# Patient Record
Sex: FEMALE | Race: WHITE | Employment: PART TIME | URBAN - NONMETROPOLITAN AREA
[De-identification: names, ages, dates, MRNs, and addresses within clinical notes are randomized per-mention and may not be internally consistent; named-entity substitution may affect disease eponyms.]

---

## 2017-01-03 PROCEDURE — 82274 ASSAY TEST FOR BLOOD FECAL: CPT | Mod: 90 | Performed by: PHYSICIAN ASSISTANT

## 2017-01-03 PROCEDURE — 99000 SPECIMEN HANDLING OFFICE-LAB: CPT | Performed by: PHYSICIAN ASSISTANT

## 2017-01-05 DIAGNOSIS — Z12.11 SPECIAL SCREENING FOR MALIGNANT NEOPLASMS, COLON: ICD-10-CM

## 2017-01-05 LAB — HEMOCCULT STL QL IA: NEGATIVE

## 2017-01-16 ENCOUNTER — OFFICE VISIT (OUTPATIENT)
Dept: FAMILY MEDICINE | Facility: OTHER | Age: 57
End: 2017-01-16
Payer: COMMERCIAL

## 2017-01-16 VITALS
SYSTOLIC BLOOD PRESSURE: 122 MMHG | TEMPERATURE: 95.8 F | BODY MASS INDEX: 33.45 KG/M2 | WEIGHT: 200.8 LBS | HEIGHT: 65 IN | DIASTOLIC BLOOD PRESSURE: 70 MMHG | RESPIRATION RATE: 16 BRPM | HEART RATE: 72 BPM

## 2017-01-16 DIAGNOSIS — Z01.419 WELL WOMAN EXAM WITH ROUTINE GYNECOLOGICAL EXAM: Primary | ICD-10-CM

## 2017-01-16 DIAGNOSIS — I10 HYPERTENSION GOAL BP (BLOOD PRESSURE) < 140/90: ICD-10-CM

## 2017-01-16 DIAGNOSIS — M19.012 PRIMARY OSTEOARTHRITIS OF LEFT SHOULDER: ICD-10-CM

## 2017-01-16 DIAGNOSIS — Z00.00 ROUTINE GENERAL MEDICAL EXAMINATION AT A HEALTH CARE FACILITY: ICD-10-CM

## 2017-01-16 DIAGNOSIS — K21.9 GASTROESOPHAGEAL REFLUX DISEASE WITHOUT ESOPHAGITIS: ICD-10-CM

## 2017-01-16 DIAGNOSIS — Z13.6 CARDIOVASCULAR SCREENING; LDL GOAL LESS THAN 160: ICD-10-CM

## 2017-01-16 DIAGNOSIS — F33.0 MAJOR DEPRESSIVE DISORDER, RECURRENT EPISODE, MILD (H): ICD-10-CM

## 2017-01-16 DIAGNOSIS — J45.20 MILD INTERMITTENT ASTHMA WITHOUT COMPLICATION: ICD-10-CM

## 2017-01-16 LAB
ALBUMIN SERPL-MCNC: 3.5 G/DL (ref 3.4–5)
ALP SERPL-CCNC: 79 U/L (ref 40–150)
ALT SERPL W P-5'-P-CCNC: 19 U/L (ref 0–50)
ANION GAP SERPL CALCULATED.3IONS-SCNC: 4 MMOL/L (ref 3–14)
AST SERPL W P-5'-P-CCNC: 15 U/L (ref 0–45)
BILIRUB SERPL-MCNC: 0.4 MG/DL (ref 0.2–1.3)
BUN SERPL-MCNC: 10 MG/DL (ref 7–30)
CALCIUM SERPL-MCNC: 8.2 MG/DL (ref 8.5–10.1)
CHLORIDE SERPL-SCNC: 104 MMOL/L (ref 94–109)
CHOLEST SERPL-MCNC: 196 MG/DL
CO2 SERPL-SCNC: 33 MMOL/L (ref 20–32)
CREAT SERPL-MCNC: 0.64 MG/DL (ref 0.52–1.04)
ERYTHROCYTE [DISTWIDTH] IN BLOOD BY AUTOMATED COUNT: 14.7 % (ref 10–15)
GFR SERPL CREATININE-BSD FRML MDRD: ABNORMAL ML/MIN/1.7M2
GLUCOSE SERPL-MCNC: 83 MG/DL (ref 70–99)
HCT VFR BLD AUTO: 38.9 % (ref 35–47)
HDLC SERPL-MCNC: 82 MG/DL
HGB BLD-MCNC: 13.1 G/DL (ref 11.7–15.7)
LDLC SERPL CALC-MCNC: 90 MG/DL
MCH RBC QN AUTO: 31.3 PG (ref 26.5–33)
MCHC RBC AUTO-ENTMCNC: 33.7 G/DL (ref 31.5–36.5)
MCV RBC AUTO: 93 FL (ref 78–100)
MICRO REPORT STATUS: ABNORMAL
NONHDLC SERPL-MCNC: 114 MG/DL
PLATELET # BLD AUTO: 229 10E9/L (ref 150–450)
POTASSIUM SERPL-SCNC: 3.4 MMOL/L (ref 3.4–5.3)
PROT SERPL-MCNC: 7.1 G/DL (ref 6.8–8.8)
RBC # BLD AUTO: 4.18 10E12/L (ref 3.8–5.2)
SODIUM SERPL-SCNC: 141 MMOL/L (ref 133–144)
SPECIMEN SOURCE: ABNORMAL
TRIGL SERPL-MCNC: 118 MG/DL
TSH SERPL DL<=0.005 MIU/L-ACNC: 1.22 MU/L (ref 0.4–4)
WBC # BLD AUTO: 6.3 10E9/L (ref 4–11)
WET PREP SPEC: ABNORMAL

## 2017-01-16 PROCEDURE — 84443 ASSAY THYROID STIM HORMONE: CPT | Performed by: PHYSICIAN ASSISTANT

## 2017-01-16 PROCEDURE — 87624 HPV HI-RISK TYP POOLED RSLT: CPT | Performed by: PHYSICIAN ASSISTANT

## 2017-01-16 PROCEDURE — 36415 COLL VENOUS BLD VENIPUNCTURE: CPT | Performed by: PHYSICIAN ASSISTANT

## 2017-01-16 PROCEDURE — 85027 COMPLETE CBC AUTOMATED: CPT | Performed by: PHYSICIAN ASSISTANT

## 2017-01-16 PROCEDURE — 80061 LIPID PANEL: CPT | Performed by: PHYSICIAN ASSISTANT

## 2017-01-16 PROCEDURE — G0145 SCR C/V CYTO,THINLAYER,RESCR: HCPCS | Performed by: PHYSICIAN ASSISTANT

## 2017-01-16 PROCEDURE — 80053 COMPREHEN METABOLIC PANEL: CPT | Performed by: PHYSICIAN ASSISTANT

## 2017-01-16 PROCEDURE — 87210 SMEAR WET MOUNT SALINE/INK: CPT | Performed by: PHYSICIAN ASSISTANT

## 2017-01-16 PROCEDURE — 99396 PREV VISIT EST AGE 40-64: CPT | Performed by: PHYSICIAN ASSISTANT

## 2017-01-16 RX ORDER — OMEPRAZOLE 40 MG/1
CAPSULE, DELAYED RELEASE ORAL
Qty: 90 CAPSULE | Refills: 1 | Status: SHIPPED | OUTPATIENT
Start: 2017-01-16 | End: 2017-08-01

## 2017-01-16 ASSESSMENT — PAIN SCALES - GENERAL: PAINLEVEL: MODERATE PAIN (5)

## 2017-01-16 NOTE — NURSING NOTE
"Chief Complaint   Patient presents with     Physical       Initial /70 mmHg  Pulse 72  Temp(Src) 95.8  F (35.4  C) (Oral)  Resp 16  Ht 5' 5\" (1.651 m)  Wt 200 lb 12.8 oz (91.082 kg)  BMI 33.41 kg/m2  LMP 07/20/2010 (Approximate) Estimated body mass index is 33.41 kg/(m^2) as calculated from the following:    Height as of this encounter: 5' 5\" (1.651 m).    Weight as of this encounter: 200 lb 12.8 oz (91.082 kg).  BP completed using cuff size: anais YBARRA LPN      "

## 2017-01-16 NOTE — PROGRESS NOTES
SUBJECTIVE:     CC: Yamel Martinez is an 56 year old woman who presents for preventive health visit.     Healthy Habits:    Do you get at least three servings of calcium containing foods daily (dairy, green leafy vegetables, etc.)? yes    Amount of exercise or daily activities, outside of work: 3 day(s) per week    Problems taking medications regularly No    Medication side effects: No    Have you had an eye exam in the past two years? no    Do you see a dentist twice per year? yes  Do you have sleep apnea, excessive snoring or daytime drowsiness?no    Struggling with left shoulder pain that appears to be muscular.    Today's PHQ-2 Score:   PHQ-2 ( 1999 [employer]) 1/16/2017 2/9/2015   Q1: Little interest or pleasure in doing things 0 0   Q2: Feeling down, depressed or hopeless 0 0   PHQ-2 Score 0 0       Abuse: Current or Past(Physical, Sexual or Emotional)- No  Do you feel safe in your environment - Yes    Social History   Substance Use Topics     Smoking status: Former Smoker -- 0.50 packs/day     Quit date: 01/01/2013     Smokeless tobacco: Never Used      Comment: Quit 3 yrs ago, however smokes up to 5 cigarettes weekly     Alcohol Use: 0.0 oz/week     0 Standard drinks or equivalent per week      Comment: 2-3  weekly     The patient does not drink >3 drinks per day nor >7 drinks per week.    Recent Labs   Lab Test 03/12/15  02/09/15   1036  05/05/14   1059   CHOL  195  178  187   HDL  52  61  52   LDL  127  97  118   TRIG  81  102  86   CHOLHDLRATIO   --   2.9  4.0       Reviewed orders with patient.  Reviewed health maintenance and updated orders accordingly - Yes    Mammo Decision Support:  Patient over age 50, mutual decision to screen reflected in health maintenance.    Pertinent mammograms are reviewed under the imaging tab.  History of abnormal Pap smear:   Remote - age 30-65 PAP every 5 years with negative HPV co-testing recommended  Last 3 Pap Results:   PAP (no units)   Date Value   02/03/2014 NIL    12/15/2010 NIL   12/10/2007 NIL     All Histories reviewed and updated in Epic.  Past Medical History   Diagnosis Date     Abnormal Papanicolaou smear of cervix and cervical HPV      Obesity, unspecified      Depressive disorder, not elsewhere classified      Unspecified asthma(493.90)      Asthma     Diagnostic skin and sensitization tests 4/17/12 [facility] pos. for dog/DM/M only     Allergic rhinitis due to animal dander      House dust mite allergy      Allergy to mold spores      4/17/12 [facility] pos. for dog/DM/M only     Esophageal reflux 9/9/2015     Hip pain, right 2/2/2016     Seasonal allergic rhinitis 2/2/2016     Osteoarthritis of right hip, unspecified osteoarthritis type 2/2/2016      Past Surgical History   Procedure Laterality Date     C ligate fallopian tube       Hc colp cervix/upper vagina w loop elec bx cervix  1999     C nonspecific procedure       Sinus surgery cyst and septum     C nonspecific procedure       Vein closure     Joint replacement, hip rt/lt  2010     Joint Replacement Hip LT     Incision and drainage hip, combined  2010     Post operative     Joint replacement, hip rt/lt  01/07/13     left total hip arthroplasty     Vulvectomy radical (no groin dissection)  4/18/2013     Procedure: VULVECTOMY RADICAL (NO GROIN DISSECTION);  Left radical Vulvectomy ;  Surgeon: Gloria Broussard MD;  Location: [facility] OR     Arthroplasty hip Right 3/28/2016     Procedure: ARTHROPLASTY HIP;  Surgeon: Juan Espinoza MD;  Location:  OR       ROS:  C: NEGATIVE for fever, chills, change in weight  I: NEGATIVE for worrisome rashes, moles or lesions  E: NEGATIVE for vision changes or irritation  ENT: NEGATIVE for ear, mouth and throat problems  R: NEGATIVE for significant cough or SOB  B: NEGATIVE for masses, tenderness or discharge  CV: NEGATIVE for chest pain, palpitations or peripheral edema  GI: NEGATIVE for nausea, abdominal pain, heartburn, or change in bowel habits  : NEGATIVE for unusual urinary or  "vaginal symptoms. No vaginal bleeding.  M: NEGATIVE for significant arthralgias or myalgia  N: NEGATIVE for weakness, dizziness or paresthesias  P: NEGATIVE for changes in mood or affect     Problem list, Medication list, Allergies, and Medical/Social/Surgical histories reviewed in [facility] and updated as appropriate.  OBJECTIVE:     /70 mmHg  Pulse 72  Temp(Src) 95.8  F (35.4  C) (Oral)  Resp 16  Ht 5' 5\" (1.651 m)  Wt 200 lb 12.8 oz (91.082 kg)  BMI 33.41 kg/m2  LMP 07/20/2010 (Approximate)  EXAM:  GENERAL APPEARANCE: healthy, alert and no distress  EYES: Eyes grossly normal to inspection, PERRL and conjunctivae and sclerae normal  HENT: ear canals and TM's normal, nose and mouth without ulcers or lesions, oropharynx clear and oral mucous membranes moist  NECK: no adenopathy, no asymmetry, masses, or scars and thyroid normal to palpation  RESP: lungs clear to auscultation - no rales, rhonchi or wheezes  BREAST: normal without masses, tenderness or nipple discharge and no palpable axillary masses or adenopathy  CV: regular rate and rhythm, normal S1 S2, no S3 or S4, no murmur, click or rub, no peripheral edema and peripheral pulses strong  ABDOMEN: soft, nontender, no hepatosplenomegaly, no masses and bowel sounds normal  MS: no musculoskeletal defects are noted and gait is age appropriate without ataxia  SKIN: no suspicious lesions or rashes  NEURO: Normal strength and tone, sensory exam grossly normal, mentation intact and speech normal  PSYCH: mentation appears normal and affect normal/bright  Pelvic Exam: mild vaginal atrophy  normal vagina and vulva,normal cervix without lesions or tenderness,uterus normal size anteverted,adenxa normal in size without tenderness,pap smear done,exam chaperoned by nurse.      ASSESSMENT/PLAN:     Yamel was seen today for physical.    Diagnoses and all orders for this visit:    Gastroesophageal reflux disease without esophagitis  -     omeprazole (PRILOSEC) 40 MG " "capsule; TAKE ONE CAPSULE BY MOUTH EVERY DAY 30-60 MINUTES BEFORE A MEAL-PATIENT REQUESTS SANDOZ BRAND (NO FURTHER REFILLS WITHOUT OFFICE VISIT)    Major depressive disorder, recurrent episode, mild (H)  -     DEPRESSION ACTION PLAN (DAP)        COUNSELING:   Reviewed preventive health counseling, as reflected in patient instructions       Regular exercise       Healthy diet/nutrition       Vision screening       Hearing screening         reports that she quit smoking about 4 years ago. She has never used smokeless tobacco.    Estimated body mass index is 33.41 kg/(m^2) as calculated from the following:    Height as of this encounter: 5' 5\" (1.651 m).    Weight as of this encounter: 200 lb 12.8 oz (91.082 kg).   Weight management plan: Discussed healthy diet and exercise guidelines and patient will follow up in 6 months in clinic to re-evaluate.    Counseling Resources:  ATP IV Guidelines  Pooled Cohorts Equation Calculator  Breast Cancer Risk Calculator  FRAX Risk Assessment  ICSI Preventive Guidelines  Dietary Guidelines for Americans, 2010  USDA's MyPlate  ASA Prophylaxis  Lung CA Screening    Albert Devlin PA-C  [facility]  "

## 2017-01-16 NOTE — PATIENT INSTRUCTIONS
Preventive Health Recommendations  Female Ages 50 - 64    Yearly exam: See your health care provider every year in order to  o Review health changes.   o Discuss preventive care.    o Review your medicines if your doctor has prescribed any.      Get a Pap test every three years (unless you have an abnormal result and your provider advises testing more often).    If you get Pap tests with HPV test, you only need to test every 5 years, unless you have an abnormal result.     You do not need a Pap test if your uterus was removed (hysterectomy) and you have not had cancer.    You should be tested each year for STDs (sexually transmitted diseases) if you're at risk.     Have a mammogram every 1 to 2 years.    Have a colonoscopy at age 50, or have a yearly FIT test (stool test). These exams screen for colon cancer.      Have a cholesterol test every 5 years, or more often if advised.    Have a diabetes test (fasting glucose) every three years. If you are at risk for diabetes, you should have this test more often.     If you are at risk for osteoporosis (brittle bone disease), think about having a bone density scan (DEXA).    Shots: Get a flu shot each year. Get a tetanus shot every 10 years.    Nutrition:     Eat at least 5 servings of fruits and vegetables each day.    Eat whole-grain bread, whole-wheat pasta and brown rice instead of white grains and rice.    Talk to your provider about Calcium and Vitamin D.     Lifestyle    Exercise at least 150 minutes a week (30 minutes a day, 5 days a week). This will help you control your weight and prevent disease.    Limit alcohol to one drink per day.    No smoking.     Wear sunscreen to prevent skin cancer.     See your dentist every six months for an exam and cleaning.    See your eye doctor every 1 to 2 years.                                       Rotator Cuff Injury   What is a rotator cuff injury?   A rotator cuff injury is a strain or tear in the group of tendons and  muscles that hold your shoulder joint together and help move your shoulder.   How does it occur?   A rotator cuff injury may result from:     using your arm to break a fall     falling onto your arm     lifting a heavy object     use of your shoulder in sports with a repetitive overhead movement, such as swimming, baseball (mainly pitchers), football, and tennis, which gradually strains the tendon     manual labor such as painting, plastering, raking leaves, or housework   What are the symptoms?   The symptoms of a torn rotator cuff are:     arm and shoulder pain     shoulder weakness     shoulder tenderness     loss of shoulder movement, especially overhead   How is it diagnosed?   Your healthcare provider will examine you and check your shoulder for pain, tenderness, and loss of motion as you move your arm in all directions. Your provider will ask if your shoulder pain began suddenly or gradually. You may have an X-ray to make sure there are not any fractures or bone spurs.   Based on these results, you may have other tests or procedures right away or later, such as:     magnetic resonance imaging (MRI), which creates images of your shoulder and surrounding structures with sound waves     an arthrogram, which is an X-ray or MRI that is taken after a special dye has been injected into your shoulder joint to outline its soft structures     arthroscopy, a surgical procedure in which a small instrument is inserted into your shoulder joint so your provider can look directly at your rotator cuff   What is the treatment?   A tendon in your shoulder can be inflamed, partially torn, or completely torn. What is done about it depends on how torn it is and how much it hurts.   If your tear is a minor one, it can be left to heal by itself if it does not interfere with your everyday activities. Your treatment plan should include:     proper sitting posture, in which your head and shoulders are balanced     rest for your  shoulder, which means avoiding strenuous activity or any overhead motion that causes pain     ice packs at least once a day, and preferably 2 or 3 times a day     doing the exercises your healthcare provider gives you     anti-inflammatory drugs. Adults aged 65 years and older should not take non-steroidal anti-inflammatory medicine for more than 7 days without their healthcare provider's approval.     physical therapy to strengthen your shoulder as it heals   If you have a bad tear, you may need to have it repaired by arthroscopy. Arthroscopy can be used to perform surgery on a joint as well as to see inside the joint. The rough edges of a torn tendon can be trimmed and left to heal. Larger tears can be stitched back together. After surgery, your treatment plan will include physical therapy to strengthen your shoulder as it heals.   How long will the effects last?   Full recovery depends on what is torn and how it is treated.   When can I return to my normal activities?   Everyone recovers from an injury at a different rate. Return to your activities will be determined by how soon your shoulder recovers, not by how many days or weeks it has been since your injury has occurred. In general, the longer you have symptoms before you start treatment, the longer it will take to get better. The goal of rehabilitation is to return you to your normal activities as soon as is safely possible. If you return too soon you may worsen your injury.   You may safely return to your normal activities when:     Your injured shoulder has full range of motion without pain.     Your injured shoulder has regained normal strength compared to the uninjured shoulder.   What can be done to help prevent this from recurring?   The best way to prevent a recurrence is to strengthen your shoulder muscles and keep them in peak condition with shoulder exercises.           Rotator Cuff Strain Rehabilitation Exercises                You may do all of  these exercises right away.   Isometric shoulder external rotation:  a doorway with your elbow bent 90 degrees and the back of the wrist on your injured side pressed against the door frame. Try to press your hand outward into the door frame. Hold for 5 seconds. Do 3 sets of 10.   Isometric shoulder internal rotation:  a doorway with your elbow bent 90 degrees and the front of the wrist on your injured side pressed against the door frame. Try to press your palm into the door frame. Hold for 5 seconds. Do 3 sets of 10.   Wand exercise: Flexion: Stand upright and hold a stick in both hands, palms down. Stretch your arms by lifting them over your head, keeping your arms straight. Hold for 5 seconds and return to the starting position. Repeat 10 times.   Wand exercise: Extension: Stand upright and hold a stick in both hands behind your back. Move the stick away from your back. Hold the end position for 5 seconds. Relax and return to the starting position. Repeat 10 times.   Wand exercise: External rotation: Lie on your back and hold a stick in both hands, palms up. Your upper arms should be resting on the floor with your elbows at your sides and bent 90 degrees. Use your uninjured arm to push your injured arm out away from your body. Keep the elbow of your injured arm at your side while it is being pushed. Hold the stretch for 5 seconds. Repeat 10 times.   Wand exercise: Shoulder abduction and adduction: Stand and hold a stick with both hands, palms facing away from your body. Rest the stick against the front of your thighs. Use your uninjured arm to push your injured arm out to the side and up as high as possible. Keep your arms straight. Hold for 5 seconds. Repeat 10 times.   Resisted shoulder external rotation: Stand sideways next to a door with your injured arm farther from the door. Tie a knot in the end of the tubing and shut the knot in the door at waist level. Hold the other end of the tubing with  the hand of your injured arm. Rest the hand of your injured arm across your stomach. Keeping your elbow in at your side, rotate your arm outward and away from your waist. Make sure you keep your elbow bent 90 degrees and your forearm parallel to the floor. Repeat 10 times. Build up to 3 sets of 10.   Resisted shoulder internal rotation: Stand sideways next to a door with your injured arm closest to the door. Tie a knot in the end of the tubing and shut the knot in the door at waist level. Hold the other end of the tubing with the hand of your injured arm. Bend the elbow of your injured arm 90 degrees. Keeping your elbow in at your side, rotate your forearm across your body and then back to the starting position. Make sure you keep your forearm parallel to the floor. Do 3 sets of 10.   Scaption: Stand with your arms at your sides and with your elbows straight. Slowly raise your arms to eye level. As you raise your arms, spread them apart so that they are only slightly in front of your body (at about a 30-degree angle to the front of your body). Point your thumbs toward the ceiling. Hold for 2 seconds and lower your arms slowly. Do 3 sets of 10. Progress to holding a soup can or light weight when you are doing the exercise and increase the weight as the exercise gets easier.   Side-lying external rotation: Lie on your uninjured side with your injured arm at your side and your elbow bent 90 degrees. Keeping your elbow against your side, raise your forearm toward the ceiling and hold for 2 seconds. Slowly lower your arm. Do 3 sets of 10. You can start doing this exercise holding a soup can or light weight and gradually increase the weight as long as there is no pain.   Horizontal abduction: Lie on your stomach on a table or the edge of a bed with the arm on your injured side hanging down over the edge. Raise your arm out to the side, with your thumb pointed toward the ceiling, until your arm is parallel to the floor.  "Hold for 2 seconds and then lower it slowly. Start this exercise with no weight. As you get stronger, add a light weight or hold a soup can. Do 3 sets of 10.   Push-up with a plus: Begin on the floor on your hands and knees. Keep your arms a shoulder width apart and lift your feet off the floor. Arch your back as high as possible and round your shoulders (this is the \"plus\" part or the exercise). Bend your elbows and lower your body to the floor. Return to the starting position and arch your back again. Do 3 sets of 10.   Published by [employer].  This content is reviewed periodically and is subject to change as new health information becomes available. The information is intended to inform and educate and is not a replacement for medical evaluation, advice, diagnosis or treatment by a healthcare professional.   Written by Shawna Rogers, MS, PT, and Mellisa Matson PT, [facility], [facility], for [employer].   ? 2010 [facility] and/or its affiliates. All Rights Reserved.   Copyright   [employer] 2011  [employer]                  "

## 2017-01-16 NOTE — MR AVS SNAPSHOT
After Visit Summary   1/16/2017    Yamel Martinez    MRN: [id number]           Patient Information     Date Of Birth          1960        Visit Information        Provider Department      1/16/2017 9:00 AM Albert Salmeron PA-C [facility]        Today's Diagnoses     Well woman exam with routine gynecological exam    -  1     Gastroesophageal reflux disease without esophagitis         Major depressive disorder, recurrent episode, mild (H)         Routine general medical examination at a health care facility         Mild intermittent asthma without complication         CARDIOVASCULAR SCREENING; LDL GOAL LESS THAN 160         Hypertension goal BP (blood pressure) < 140/90         Primary osteoarthritis of left shoulder           Care Instructions      Preventive Health Recommendations  Female Ages 50 - 64    Yearly exam: See your health care provider every year in order to  o Review health changes.   o Discuss preventive care.    o Review your medicines if your doctor has prescribed any.      Get a Pap test every three years (unless you have an abnormal result and your provider advises testing more often).    If you get Pap tests with HPV test, you only need to test every 5 years, unless you have an abnormal result.     You do not need a Pap test if your uterus was removed (hysterectomy) and you have not had cancer.    You should be tested each year for STDs (sexually transmitted diseases) if you're at risk.     Have a mammogram every 1 to 2 years.    Have a colonoscopy at age 50, or have a yearly FIT test (stool test). These exams screen for colon cancer.      Have a cholesterol test every 5 years, or more often if advised.    Have a diabetes test (fasting glucose) every three years. If you are at risk for diabetes, you should have this test more often.     If you are at risk for osteoporosis (brittle bone disease), think about having a bone density scan (DEXA).    Shots: Get a flu shot  each year. Get a tetanus shot every 10 years.    Nutrition:     Eat at least 5 servings of fruits and vegetables each day.    Eat whole-grain bread, whole-wheat pasta and brown rice instead of white grains and rice.    Talk to your provider about Calcium and Vitamin D.     Lifestyle    Exercise at least 150 minutes a week (30 minutes a day, 5 days a week). This will help you control your weight and prevent disease.    Limit alcohol to one drink per day.    No smoking.     Wear sunscreen to prevent skin cancer.     See your dentist every six months for an exam and cleaning.    See your eye doctor every 1 to 2 years.                                       Rotator Cuff Injury   What is a rotator cuff injury?   A rotator cuff injury is a strain or tear in the group of tendons and muscles that hold your shoulder joint together and help move your shoulder.   How does it occur?   A rotator cuff injury may result from:     using your arm to break a fall     falling onto your arm     lifting a heavy object     use of your shoulder in sports with a repetitive overhead movement, such as swimming, baseball (mainly pitchers), football, and tennis, which gradually strains the tendon     manual labor such as painting, plastering, raking leaves, or housework   What are the symptoms?   The symptoms of a torn rotator cuff are:     arm and shoulder pain     shoulder weakness     shoulder tenderness     loss of shoulder movement, especially overhead   How is it diagnosed?   Your healthcare provider will examine you and check your shoulder for pain, tenderness, and loss of motion as you move your arm in all directions. Your provider will ask if your shoulder pain began suddenly or gradually. You may have an X-ray to make sure there are not any fractures or bone spurs.   Based on these results, you may have other tests or procedures right away or later, such as:     magnetic resonance imaging (MRI), which creates images of your shoulder  and surrounding structures with sound waves     an arthrogram, which is an X-ray or MRI that is taken after a special dye has been injected into your shoulder joint to outline its soft structures     arthroscopy, a surgical procedure in which a small instrument is inserted into your shoulder joint so your provider can look directly at your rotator cuff   What is the treatment?   A tendon in your shoulder can be inflamed, partially torn, or completely torn. What is done about it depends on how torn it is and how much it hurts.   If your tear is a minor one, it can be left to heal by itself if it does not interfere with your everyday activities. Your treatment plan should include:     proper sitting posture, in which your head and shoulders are balanced     rest for your shoulder, which means avoiding strenuous activity or any overhead motion that causes pain     ice packs at least once a day, and preferably 2 or 3 times a day     doing the exercises your healthcare provider gives you     anti-inflammatory drugs. Adults aged 65 years and older should not take non-steroidal anti-inflammatory medicine for more than 7 days without their healthcare provider's approval.     physical therapy to strengthen your shoulder as it heals   If you have a bad tear, you may need to have it repaired by arthroscopy. Arthroscopy can be used to perform surgery on a joint as well as to see inside the joint. The rough edges of a torn tendon can be trimmed and left to heal. Larger tears can be stitched back together. After surgery, your treatment plan will include physical therapy to strengthen your shoulder as it heals.   How long will the effects last?   Full recovery depends on what is torn and how it is treated.   When can I return to my normal activities?   Everyone recovers from an injury at a different rate. Return to your activities will be determined by how soon your shoulder recovers, not by how many days or weeks it has been  since your injury has occurred. In general, the longer you have symptoms before you start treatment, the longer it will take to get better. The goal of rehabilitation is to return you to your normal activities as soon as is safely possible. If you return too soon you may worsen your injury.   You may safely return to your normal activities when:     Your injured shoulder has full range of motion without pain.     Your injured shoulder has regained normal strength compared to the uninjured shoulder.   What can be done to help prevent this from recurring?   The best way to prevent a recurrence is to strengthen your shoulder muscles and keep them in peak condition with shoulder exercises.           Rotator Cuff Strain Rehabilitation Exercises                You may do all of these exercises right away.   Isometric shoulder external rotation:  a doorway with your elbow bent 90 degrees and the back of the wrist on your injured side pressed against the door frame. Try to press your hand outward into the door frame. Hold for 5 seconds. Do 3 sets of 10.   Isometric shoulder internal rotation:  a doorway with your elbow bent 90 degrees and the front of the wrist on your injured side pressed against the door frame. Try to press your palm into the door frame. Hold for 5 seconds. Do 3 sets of 10.   Wand exercise: Flexion: Stand upright and hold a stick in both hands, palms down. Stretch your arms by lifting them over your head, keeping your arms straight. Hold for 5 seconds and return to the starting position. Repeat 10 times.   Wand exercise: Extension: Stand upright and hold a stick in both hands behind your back. Move the stick away from your back. Hold the end position for 5 seconds. Relax and return to the starting position. Repeat 10 times.   Wand exercise: External rotation: Lie on your back and hold a stick in both hands, palms up. Your upper arms should be resting on the floor with your elbows at your  sides and bent 90 degrees. Use your uninjured arm to push your injured arm out away from your body. Keep the elbow of your injured arm at your side while it is being pushed. Hold the stretch for 5 seconds. Repeat 10 times.   Wand exercise: Shoulder abduction and adduction: Stand and hold a stick with both hands, palms facing away from your body. Rest the stick against the front of your thighs. Use your uninjured arm to push your injured arm out to the side and up as high as possible. Keep your arms straight. Hold for 5 seconds. Repeat 10 times.   Resisted shoulder external rotation: Stand sideways next to a door with your injured arm farther from the door. Tie a knot in the end of the tubing and shut the knot in the door at waist level. Hold the other end of the tubing with the hand of your injured arm. Rest the hand of your injured arm across your stomach. Keeping your elbow in at your side, rotate your arm outward and away from your waist. Make sure you keep your elbow bent 90 degrees and your forearm parallel to the floor. Repeat 10 times. Build up to 3 sets of 10.   Resisted shoulder internal rotation: Stand sideways next to a door with your injured arm closest to the door. Tie a knot in the end of the tubing and shut the knot in the door at waist level. Hold the other end of the tubing with the hand of your injured arm. Bend the elbow of your injured arm 90 degrees. Keeping your elbow in at your side, rotate your forearm across your body and then back to the starting position. Make sure you keep your forearm parallel to the floor. Do 3 sets of 10.   Scaption: Stand with your arms at your sides and with your elbows straight. Slowly raise your arms to eye level. As you raise your arms, spread them apart so that they are only slightly in front of your body (at about a 30-degree angle to the front of your body). Point your thumbs toward the ceiling. Hold for 2 seconds and lower your arms slowly. Do 3 sets of 10.  "Progress to holding a soup can or light weight when you are doing the exercise and increase the weight as the exercise gets easier.   Side-lying external rotation: Lie on your uninjured side with your injured arm at your side and your elbow bent 90 degrees. Keeping your elbow against your side, raise your forearm toward the ceiling and hold for 2 seconds. Slowly lower your arm. Do 3 sets of 10. You can start doing this exercise holding a soup can or light weight and gradually increase the weight as long as there is no pain.   Horizontal abduction: Lie on your stomach on a table or the edge of a bed with the arm on your injured side hanging down over the edge. Raise your arm out to the side, with your thumb pointed toward the ceiling, until your arm is parallel to the floor. Hold for 2 seconds and then lower it slowly. Start this exercise with no weight. As you get stronger, add a light weight or hold a soup can. Do 3 sets of 10.   Push-up with a plus: Begin on the floor on your hands and knees. Keep your arms a shoulder width apart and lift your feet off the floor. Arch your back as high as possible and round your shoulders (this is the \"plus\" part or the exercise). Bend your elbows and lower your body to the floor. Return to the starting position and arch your back again. Do 3 sets of 10.   Published by [employer].  This content is reviewed periodically and is subject to change as new health information becomes available. The information is intended to inform and educate and is not a replacement for medical evaluation, advice, diagnosis or treatment by a healthcare professional.   Written by Shawna Rogers, MS, PT, and Mellisa Matson PT, [facility], [facility], for [facility].   ? 2010 [employer] and/or its affiliates. All Rights Reserved.   Copyright   [employer] 2011  Adult Health Advisor                        Follow-ups after your visit        Your next 10 appointments already scheduled     Jan 20, 2017  " "8:30 AM   [facility] SCREENING DIG[facility] BIL[facility]AL [facility]   [facility]    [address] [facility] [address] [phone]           Do not use any powder, lotion or deodorant under your arms or on your breast. If you do, we will ask you to remove it before your exam.  Wear comfortable, two-piece clothing.  If you have any allergies, tell your care team.  Bring any previous mammograms from other facilities or have them mailed to the breast center.              Who to contact     If you have questions or need follow up information about today's clinic visit or your schedule please contact [facility] directly at [phone].  Normal or non-critical lab and imaging results will be communicated to you by [employer], letter or phone within 4 business days after the clinic has received the results. If you do not hear from us within 7 days, please contact the clinic through [employer] or phone. If you have a critical or abnormal lab result, we will notify you by phone as soon as possible.  Submit refill requests through [facility] or call your pharmacy and they will forward the refill request to us. Please allow 3 business days for your refill to be completed.          Additional Information About Your Visit        [facility]i[employer]ozes Information     [facility] lets you send messages to your doctor, view your test results, renew your prescriptions, schedule appointments and more. To sign up, go to [email or web address] . Click on \"Log in\" on the left side of the screen, which will take you to the Welcome page. Then click on \"Sign up Now\" on the right side of the page.     You will be asked to enter the access code listed below, as well as some personal information. Please follow the directions to create your username and password.     Your access code is: [email or web address]  Expires: 3/19/2017 10:39 AM     Your access code will  in 90 days. If you need help or a new code, please call your " "[facility] or [phone].        Care EveryWhere ID     This is your Care EveryWhere ID. This could be used by other organizations to access your [facility] medical records  [id number]        Your Vitals Were     Pulse Temperature Respirations Height BMI (Body Mass Index) Last Period    72 95.8  F (35.4  C) (Oral) 16 5' 5\" (1.651 m) 33.41 kg/m2 07/20/2010 (Approximate)       Blood Pressure from Last 3 Encounters:   01/16/17 122/70   12/19/16 122/78   03/30/16 110/68    Weight from Last 3 Encounters:   01/16/17 200 lb 12.8 oz (91.082 kg)   12/19/16 198 lb 8 oz (90.039 kg)   07/18/16 199 lb (90.266 kg)              We Performed the Following     CBC with platelets     Comprehensive metabolic panel     DEPRESSION ACTION PLAN (DAP)     Lipid Profile with reflex to direct LDL     TSH with free T4 reflex          Where to get your medicines      These medications were sent to [facility] [place] [address]   [address] [facility] [place] [id number]     Phone:  [phone]    - omeprazole 40 MG capsule       Primary Care Provider Office Phone # Fax #    Albert Salmeron PA-C [phone]       [facility] [address] [facility] [address]        Thank you!     Thank you for choosing [facility]  for your care. Our goal is always to provide you with excellent care. Hearing back from our patients is one way we can continue to improve our services. Please take a few minutes to complete the written survey that you may receive in the mail after your visit with us. Thank you!             Your Updated Medication List - Protect others around you: Learn how to safely use, store and throw away your medicines at www.disposemymeds.org.          This list is accurate as of: 1/16/17  9:26 AM.  Always use your most recent med list.                   Brand Name Dispense Instructions for use    acetaminophen 650 MG 8 hour tablet     100 tablet    Take 650 mg by mouth every 4 hours as " needed for other (surgical pain)       albuterol 108 (90 BASE) MCG/ACT Inhaler   Generic drug:  albuterol     8.5 g    INHALE ONE TO TWO PUFFS BY MOUTH FOUR TIMES A DAY AS NEEDED       CALCIUM-MAGNESIUM-ZINC PO      1 tablet daily       CENTRUM SILVER PO      Take  by mouth.       * FLUoxetine 40 MG capsule    PROzac    90 capsule    TAKE ONE CAPSULE BY MOUTH EVERY DAY WITH 20MG CAPSULE       * FLUoxetine 20 MG capsule    PROzac    90 capsule    TAKE ONE CAPSULE BY MOUTH EVERY DAY       fluticasone 110 MCG/ACT Inhaler    FLOVENT HFA    3 Inhaler    Inhale 2 puffs into the lungs 2 times daily       fluticasone 50 MCG/ACT spray    FLONASE    32 g    Spray 2 sprays into both nostrils 2 times daily       omeprazole 40 MG capsule    priLOSEC    90 capsule    TAKE ONE CAPSULE BY MOUTH EVERY DAY 30-60 MINUTES BEFORE A MEAL-PATIENT REQUESTS SANDOZ BRAND (NO FURTHER REFILLS WITHOUT OFFICE VISIT)       ondansetron 4 MG ODT tab    ZOFRAN-ODT    60 tablet    DISSOLVE TWO TABLETS ON TONGUE EVERY 8 HOURS AS NEEDED FOR NAUSEA       ZYRTEC 10 MG tablet   Generic drug:  cetirizine     1 month    ONE TABLET DAILY       * Notice:  This list has 2 medication(s) that are the same as other medications prescribed for you. Read the directions carefully, and ask your doctor or other care provider to review them with you.

## 2017-01-16 NOTE — Clinical Note
[facility]  [address] [facility] [address] [phone]      January 24, 2017    Yamel Martinez  [address]  [facility] [address]    Dear Yamel,  We are happy to inform you that your PAP smear result from 1/16/17 is normal.  We are now able to do a follow up test on PAP smears. The DNA test is for HPV (Human Papilloma Virus). Cervical cancer is closely linked with certain types of HPV. Your result showed no evidence of high risk HPV.  Therefore we recommend you return in 5 years for your next pap smear and HPV test.  You will still need to return to the clinic every year for an annual exam and other preventive tests.  Please contact the clinic with any questions.  Sincerely,  Albert Devlin PA-C/ross

## 2017-01-17 LAB
COPATH REPORT: NORMAL
PAP: NORMAL

## 2017-01-17 ASSESSMENT — ASTHMA QUESTIONNAIRES: ACT_TOTALSCORE: 22

## 2017-01-20 ENCOUNTER — RADIANT APPOINTMENT (OUTPATIENT)
Dept: MAMMOGRAPHY | Facility: OTHER | Age: 57
End: 2017-01-20
Attending: PHYSICIAN ASSISTANT
Payer: COMMERCIAL

## 2017-01-20 DIAGNOSIS — Z12.31 VISIT FOR SCREENING MAMMOGRAM: ICD-10-CM

## 2017-01-20 LAB
FINAL DIAGNOSIS: NORMAL
HPV HR 12 DNA CVX QL NAA+PROBE: NEGATIVE
HPV16 DNA SPEC QL NAA+PROBE: NEGATIVE
HPV18 DNA SPEC QL NAA+PROBE: NEGATIVE
SPECIMEN DESCRIPTION: NORMAL

## 2017-01-20 PROCEDURE — G0202 SCR MAMMO BI INCL CAD: HCPCS | Mod: TC

## 2017-02-02 DIAGNOSIS — F33.0 MAJOR DEPRESSIVE DISORDER, RECURRENT EPISODE, MILD (H): Primary | ICD-10-CM

## 2017-02-02 RX ORDER — FLUOXETINE 40 MG/1
CAPSULE ORAL
Qty: 90 CAPSULE | Refills: 1 | Status: SHIPPED | OUTPATIENT
Start: 2017-02-02 | End: 2017-09-28

## 2017-02-02 NOTE — TELEPHONE ENCOUNTER
Prozac       Last Written Prescription Date: 10/25/2016  Last Fill Quantity: 90; # refills: 0  Last Office Visit with [facility], [facility] or  Health prescribing provider:  1-        Last PHQ-9 score on record=   PHQ-9 SCORE 12/19/2016   Total Score -   Total Score 0       AST       15   1/16/2017  ALT       19   1/16/2017

## 2017-02-02 NOTE — TELEPHONE ENCOUNTER
Routing refill request to provider for review/approval because:  Drug interaction warning    Catrina Mckeon, RN  [facility]

## 2017-02-08 ENCOUNTER — OFFICE VISIT (OUTPATIENT)
Dept: FAMILY MEDICINE | Facility: OTHER | Age: 57
End: 2017-02-08
Payer: COMMERCIAL

## 2017-02-08 VITALS
TEMPERATURE: 98.6 F | WEIGHT: 195.3 LBS | DIASTOLIC BLOOD PRESSURE: 82 MMHG | HEART RATE: 98 BPM | RESPIRATION RATE: 16 BRPM | SYSTOLIC BLOOD PRESSURE: 130 MMHG | BODY MASS INDEX: 32.5 KG/M2 | OXYGEN SATURATION: 98 %

## 2017-02-08 DIAGNOSIS — R07.0 THROAT PAIN: Primary | ICD-10-CM

## 2017-02-08 DIAGNOSIS — I10 HYPERTENSION GOAL BP (BLOOD PRESSURE) < 140/90: ICD-10-CM

## 2017-02-08 DIAGNOSIS — H66.003 ACUTE SUPPURATIVE OTITIS MEDIA OF BOTH EARS WITHOUT SPONTANEOUS RUPTURE OF TYMPANIC MEMBRANES, RECURRENCE NOT SPECIFIED: ICD-10-CM

## 2017-02-08 LAB
DEPRECATED S PYO AG THROAT QL EIA: NORMAL
MICRO REPORT STATUS: NORMAL
SPECIMEN SOURCE: NORMAL

## 2017-02-08 PROCEDURE — 87081 CULTURE SCREEN ONLY: CPT | Performed by: INTERNAL MEDICINE

## 2017-02-08 PROCEDURE — 87880 STREP A ASSAY W/OPTIC: CPT | Performed by: INTERNAL MEDICINE

## 2017-02-08 PROCEDURE — 99213 OFFICE O/P EST LOW 20 MIN: CPT | Performed by: INTERNAL MEDICINE

## 2017-02-08 RX ORDER — AMOXICILLIN 500 MG/1
500 CAPSULE ORAL 3 TIMES DAILY
Qty: 30 CAPSULE | Refills: 0 | Status: SHIPPED | OUTPATIENT
Start: 2017-02-08 | End: 2017-05-04

## 2017-02-08 ASSESSMENT — PAIN SCALES - GENERAL: PAINLEVEL: EXTREME PAIN (8)

## 2017-02-08 NOTE — PROGRESS NOTES
SUBJECTIVE:                                                    Yamel Martinez is a 56 year old female who presents to clinic today for the following health issues:      Acute Illness   Acute illness concerns: sinus  Onset: 3 days     Fever: no    Chills/Sweats: YES    Headache (location?): YES    Sinus Pressure:YES- tender, post-nasal drainage and facial pain    Conjunctivitis:  no    Ear Pain: feel full    Rhinorrhea: YES    Congestion: YES    Sore Throat: YES     Cough: YES    Wheeze: YES    Decreased Appetite: YES    Nausea: YES    Vomiting: YES- mucus    Diarrhea:  YES    Dysuria/Freq.: no    Fatigue/Achiness: YES    Sick/Strep Exposure: no      Therapies Tried and outcome: acetaminophen and over the counter sinus relief mild relief    CHIEF COMPLAINT:    The patient is a 56-year-old female who presents today with a three-day history of nasal congestion, posterior nasal drainage, some throat pain, and ear pressure. She notes that she's had coughing which is on occasion caused her to almost want to vomit. She has a slightly decreased appetite but is taking fluids adequately. She's had a low-grade fever but has not measured it. She is taking her other medications as recommended.                         PAST, FAMILY,SOCIAL HISTORY:     Medical  History:   has a past medical history of Abnormal Papanicolaou smear of cervix and cervical HPV; Obesity, unspecified; Depressive disorder, not elsewhere classified; Unspecified asthma(493.90); Diagnostic skin and sensitization tests (4/17/12 RAST pos. for dog/DM/M only); Allergic rhinitis due to animal dander; House dust mite allergy; Allergy to mold spores; Esophageal reflux (9/9/2015); Hip pain, right (2/2/2016); Seasonal allergic rhinitis (2/2/2016); Osteoarthritis of right hip, unspecified osteoarthritis type (2/2/2016); and Primary osteoarthritis of left shoulder (1/16/2017).     Surgical History:   has past surgical history that includes LIGATE FALLOPIAN TUBE; COLP  CERVIX/UPPER VAGINA W LOOP ELEC BX CERVIX (1999); NONSPECIFIC PROCEDURE; NONSPECIFIC PROCEDURE; joint replacement, hip rt/lt (2010); Incision and drainage hip, combined (2010); joint replacement, hip rt/lt (01/07/13); Vulvectomy radical (no groin dissection) (4/18/2013); and Arthroplasty hip (Right, 3/28/2016).     Social History:   reports that she quit smoking about 4 years ago. She has never used smokeless tobacco. She reports that she drinks alcohol. She reports that she does not use illicit drugs.     Family History:  family history includes Breast Cancer in her maternal grandmother; DIABETES in her father and paternal grandmother; Hypertension in her mother; Thyroid Disease in her father. There is no history of Anesthesia Reaction, Blood Disease, C.A.D., or Cancer - colorectal.            MEDICATIONS  Current Outpatient Prescriptions   Medication Sig Dispense Refill     amoxicillin (AMOXIL) 500 MG capsule Take 1 capsule (500 mg) by mouth 3 times daily 30 capsule 0     FLUoxetine (PROZAC) 20 MG capsule TAKE ONE CAPSULE BY MOUTH EVERY DAY 90 capsule 1     FLUoxetine (PROZAC) 40 MG capsule TAKE ONE CAPSULE BY MOUTH EVERY DAY WITH 20MG CAPSULE 90 capsule 1     omeprazole (PRILOSEC) 40 MG capsule TAKE ONE CAPSULE BY MOUTH EVERY DAY 30-60 MINUTES BEFORE A MEAL-PATIENT REQUESTS SANDOZ BRAND (NO FURTHER REFILLS WITHOUT OFFICE VISIT) 90 capsule 1     ondansetron (ZOFRAN-ODT) 4 MG ODT tab DISSOLVE TWO TABLETS ON TONGUE EVERY 8 HOURS AS NEEDED FOR NAUSEA 60 tablet 0     fluticasone (FLOVENT HFA) 110 MCG/ACT Inhaler Inhale 2 puffs into the lungs 2 times daily 3 Inhaler 1     acetaminophen 650 MG 8 hour tablet Take 650 mg by mouth every 4 hours as needed for other (surgical pain) 100 tablet 1     CALCIUM-MAGNESIUM-ZINC PO 1 tablet daily       ALBUTEROL 108 (90 BASE) MCG/ACT inhaler INHALE ONE TO TWO PUFFS BY MOUTH FOUR TIMES A DAY AS NEEDED 8.5 g 5     Multiple Vitamins-Minerals (CENTRUM SILVER PO) Take  by mouth.        ZYRTEC 10 MG OR TABS ONE TABLET DAILY 1 month 2     [DISCONTINUED] FLUoxetine (PROZAC) 40 MG capsule TAKE ONE CAPSULE BY MOUTH EVERY DAY WITH 20MG CAPSULE 90 capsule 0     [DISCONTINUED] FLUoxetine (PROZAC) 20 MG capsule TAKE ONE CAPSULE BY MOUTH EVERY DAY 90 capsule 0     fluticasone (FLONASE) 50 MCG/ACT nasal spray Spray 2 sprays into both nostrils 2 times daily 32 g 9         --------------------------------------------------------------------------------------------------------------------                          REVIEW OF SYSTEMS:         LUNGS: Pt denies: hemoptysis, or shortness of breath.   HEART: Pt denies: chest pain, arrythmia, syncope, tachy or bradyarrhythmia or excess edema.   GI: Pt denies: nausea, vomitting, diarrhea, constipation, melena, or hematochezia.   NEURO: Pt denies: seizures, strokes, diplopia, weakness, paraesthesias, or paralysis.   SKIN: Pt denies: itching, rashes, discoloration, or specific lesions of concern. Denies recent hair loss.                          EXAMINATION:         /82 mmHg  Pulse 98  Temp(Src) 98.6  F (37  C) (Temporal)  Resp 16  Wt 195 lb 4.8 oz (88.587 kg)  SpO2 98%  LMP 07/20/2010 (Approximate)   Constitutional: The patient appears to be in no acute distress. The patient appears to be adequately hydrated. No acute respiratory or hemodynamic distress is noted at this time.   LUNGS: clear bilaterally, airflow is brisk, no intercostal retraction or stridor is noted. No coughing is noted during visit.   HEART:  regular without rubs, clicks, gallops, or murmurs. PMI is nondisplaced. Upstrokes are brisk. S1,S2 are heard.   GI: Abdomen is soft, without rebound, guarding or tenderness. Bowel sounds are appropriate. No renal bruits are heard.    NEURO: Pt is alert and appropriate. No neurologic lateralization is noted. Cranial nerves 2-12 are intact. Peripheral sensory and motor function are grossly normal   SKIN:  warm and dry. No erythema, or rashes are noted.  No specific lesions of concern are noted.                 ENT: Nasal mucosa is edematous, thick yellow exudates are seen. Incomplete nasal obstruction is present. Pharynx is erythematous without  exudates, no significant cervical adenopathy is present. Tympanic membranes show retraction and erythema without evidence of perforation. Thyroid is not nodular or enlarged.                        DECISION MAKIN. Throat pain  Amoxicillin 500 mg 3 times daily  - Strep, Rapid Screen negative  - Beta strep group A culture    2. Acute suppurative otitis media of both ears without spontaneous rupture of tympanic membranes, recurrence not specified    - amoxicillin (AMOXIL) 500 MG capsule; Take 1 capsule (500 mg) by mouth 3 times daily  Dispense: 30 capsule; Refill: 0    3. Hypertension goal BP (blood pressure) < 140/90  Continue low-salt diet and weight loss                           FOLLOW UP    I have asked the patient to make an appointment for follow up with me in for her primary care provider as needed    I have carefully explained the diagnosis and treatment options with the patient. The patient has displayed an understanding of the above, and all subsequent questions were answered.       DO GUSTAVO Stovall    Portions of this note were produced using VirnetX  Although every attempt at real-time proof reading has been made, occasional grammar/syntax errors may have been missed.

## 2017-02-08 NOTE — MR AVS SNAPSHOT
"              After Visit Summary   2017    Yamel Martinez    MRN: [id number]           Patient Information     Date Of Birth          1960        Visit Information        Provider Department      2017 10:20 AM Dae Celis DO [facility]        Today's Diagnoses     Throat pain    -  1     Acute suppurative otitis media of both ears without spontaneous rupture of tympanic membranes, recurrence not specified         Hypertension goal BP (blood pressure) < 140/90            Follow-ups after your visit        Who to contact     If you have questions or need follow up information about today's clinic visit or your schedule please contact [facility] directly at [phone].  Normal or non-critical lab and imaging results will be communicated to you by MyChart, letter or phone within 4 business days after the clinic has received the results. If you do not hear from us within 7 days, please contact the clinic through MyChart or phone. If you have a critical or abnormal lab result, we will notify you by phone as soon as possible.  Submit refill requests through Evernote or call your pharmacy and they will forward the refill request to us. Please allow 3 business days for your refill to be completed.          Additional Information About Your Visit        MyChart Information     Evernote lets you send messages to your doctor, view your test results, renew your prescriptions, schedule appointments and more. To sign up, go to [email or web address] . Click on \"Log in\" on the left side of the screen, which will take you to the Welcome page. Then click on \"Sign up Now\" on the right side of the page.     You will be asked to enter the access code listed below, as well as some personal information. Please follow the directions to create your username and password.     Your access code is: [email or web address]  Expires: 3/19/2017 10:39 AM     Your access code will  in 90 days. If " you need help or a new code, please call your [facility] or [phone].        Care EveryWhere ID     This is your Care EveryWhere ID. This could be used by other organizations to access your [facility] medical records  [id number]        Your Vitals Were     Pulse Temperature Respirations Pulse Oximetry Last Period       98 98.6  F (37  C) (Temporal) 16 98% 07/20/2010 (Approximate)        Blood Pressure from Last 3 Encounters:   02/08/17 130/82   01/16/17 122/70   12/19/16 122/78    Weight from Last 3 Encounters:   02/08/17 195 lb 4.8 oz (88.587 kg)   01/16/17 200 lb 12.8 oz (91.082 kg)   12/19/16 198 lb 8 oz (90.039 kg)              We Performed the Following     Beta strep group A culture     Strep, Rapid Screen          Today's Medication Changes          These changes are accurate as of: 2/8/17  3:26 PM.  If you have any questions, ask your nurse or doctor.               Start taking these medicines.        Dose/Directions    amoxicillin 500 MG capsule   Commonly known as:  AMOXIL   Used for:  Acute suppurative otitis media of both ears without spontaneous rupture of tympanic membranes, recurrence not specified   Started by:  Dae Celis DO        Dose:  500 mg   Take 1 capsule (500 mg) by mouth 3 times daily   Quantity:  30 capsule   Refills:  0            Where to get your medicines      These medications were sent to [facility] [address]   [address] [facility] [address]     Phone:  [phone]    - amoxicillin 500 MG capsule             Primary Care Provider Office Phone # Fax #    Albert Salmeron PA-C [phone]       [facility] [address] [facility] [address]        Thank you!     Thank you for choosing [facility]  for your care. Our goal is always to provide you with excellent care. Hearing back from our patients is one way we can continue to improve our services. Please take a few minutes to complete the  written survey that you may receive in the mail after your visit with us. Thank you!             Your Updated Medication List - Protect others around you: Learn how to safely use, store and throw away your medicines at www.disposemymeds.org.          This list is accurate as of: 2/8/17  3:26 PM.  Always use your most recent med list.                   Brand Name Dispense Instructions for use    acetaminophen 650 MG 8 hour tablet     100 tablet    Take 650 mg by mouth every 4 hours as needed for other (surgical pain)       albuterol 108 (90 BASE) MCG/ACT Inhaler   Generic drug:  albuterol     8.5 g    INHALE ONE TO TWO PUFFS BY MOUTH FOUR TIMES A DAY AS NEEDED       amoxicillin 500 MG capsule    AMOXIL    30 capsule    Take 1 capsule (500 mg) by mouth 3 times daily       CALCIUM-MAGNESIUM-ZINC PO      1 tablet daily       CENTRUM SILVER PO      Take  by mouth.       * FLUoxetine 20 MG capsule    PROzac    90 capsule    TAKE ONE CAPSULE BY MOUTH EVERY DAY       * FLUoxetine 40 MG capsule    PROzac    90 capsule    TAKE ONE CAPSULE BY MOUTH EVERY DAY WITH 20MG CAPSULE       fluticasone 110 MCG/ACT Inhaler    FLOVENT HFA    3 Inhaler    Inhale 2 puffs into the lungs 2 times daily       fluticasone 50 MCG/ACT spray    FLONASE    32 g    Spray 2 sprays into both nostrils 2 times daily       omeprazole 40 MG capsule    priLOSEC    90 capsule    TAKE ONE CAPSULE BY MOUTH EVERY DAY 30-60 MINUTES BEFORE A MEAL-PATIENT REQUESTS SANDOZ BRAND (NO FURTHER REFILLS WITHOUT OFFICE VISIT)       ondansetron 4 MG ODT tab    ZOFRAN-ODT    60 tablet    DISSOLVE TWO TABLETS ON TONGUE EVERY 8 HOURS AS NEEDED FOR NAUSEA       ZYRTEC 10 MG tablet   Generic drug:  cetirizine     1 month    ONE TABLET DAILY       * Notice:  This list has 2 medication(s) that are the same as other medications prescribed for you. Read the directions carefully, and ask your doctor or other care provider to review them with you.

## 2017-02-08 NOTE — NURSING NOTE
"Chief Complaint   Patient presents with     Sinus Problem     3 days       Initial /82 mmHg  Pulse 98  Temp(Src) 98.6  F (37  C) (Temporal)  Resp 16  Wt 195 lb 4.8 oz (88.587 kg)  SpO2 98%  LMP 07/20/2010 (Approximate) Estimated body mass index is 32.5 kg/(m^2) as calculated from the following:    Height as of 1/16/17: 5' 5\" (1.651 m).    Weight as of this encounter: 195 lb 4.8 oz (88.587 kg).  BP completed using cuff size: large  Health Maintenance Due   Topic Date Due     ADVANCE DIRECTIVE PLANNING Q5 YRS (NO INBASKET)  12/19/1979     HEPATITIS C SCREENING  12/19/1979     MICROALBUMIN Q1 YEAR( NO INBASKET)  02/03/2015     Health Maintenance reviewed at today's visit patient asked to schedule/complete:     Brandi Gordon MA     2/8/2017    "

## 2017-02-10 LAB
BACTERIA SPEC CULT: NORMAL
MICRO REPORT STATUS: NORMAL
SPECIMEN SOURCE: NORMAL

## 2017-05-04 ENCOUNTER — OFFICE VISIT (OUTPATIENT)
Dept: FAMILY MEDICINE | Facility: OTHER | Age: 57
End: 2017-05-04
Payer: COMMERCIAL

## 2017-05-04 VITALS
TEMPERATURE: 97.6 F | HEART RATE: 80 BPM | WEIGHT: 202.7 LBS | RESPIRATION RATE: 20 BRPM | DIASTOLIC BLOOD PRESSURE: 82 MMHG | SYSTOLIC BLOOD PRESSURE: 118 MMHG | BODY MASS INDEX: 33.73 KG/M2

## 2017-05-04 DIAGNOSIS — J01.01 ACUTE RECURRENT MAXILLARY SINUSITIS: Primary | ICD-10-CM

## 2017-05-04 DIAGNOSIS — J45.20 MILD INTERMITTENT ASTHMA WITHOUT COMPLICATION: ICD-10-CM

## 2017-05-04 PROCEDURE — 99213 OFFICE O/P EST LOW 20 MIN: CPT | Performed by: FAMILY MEDICINE

## 2017-05-04 RX ORDER — FLUTICASONE PROPIONATE 110 UG/1
2 AEROSOL, METERED RESPIRATORY (INHALATION) 2 TIMES DAILY
Qty: 3 INHALER | Refills: 1 | COMMUNITY
Start: 2017-05-04 | End: 2017-09-28

## 2017-05-04 RX ORDER — AMOXICILLIN AND CLAVULANATE POTASSIUM 500; 125 MG/1; MG/1
1 TABLET, FILM COATED ORAL 2 TIMES DAILY
Qty: 20 TABLET | Refills: 0 | Status: SHIPPED | OUTPATIENT
Start: 2017-05-04 | End: 2017-06-01

## 2017-05-04 ASSESSMENT — PAIN SCALES - GENERAL: PAINLEVEL: SEVERE PAIN (6)

## 2017-05-04 NOTE — NURSING NOTE
"Chief Complaint   Patient presents with     Sinus Problem       Initial /82 (BP Location: Left arm, Patient Position: Chair, Cuff Size: Adult Large)  Pulse 80  Temp 97.6  F (36.4  C) (Temporal)  Resp 20  Wt 202 lb 11.2 oz (91.9 kg)  LMP 07/20/2010 (Approximate)  BMI 33.73 kg/m2 Estimated body mass index is 33.73 kg/(m^2) as calculated from the following:    Height as of 1/16/17: 5' 5\" (1.651 m).    Weight as of this encounter: 202 lb 11.2 oz (91.9 kg).  Medication Reconciliation: complete  "

## 2017-05-04 NOTE — PROGRESS NOTES
SUBJECTIVE:                                                    Yamel Martinez is a 56 year old female who presents to clinic today for the following health issues:      RESPIRATORY SYMPTOMS      Duration: 4 days    Description  nasal congestion, rhinorrhea, sore throat, facial pain/pressure, cough, wheezing, ear pain both, headache, fatigue/malaise, hoarse voice, myalgias, conjunctival irritation and nausea    Severity: severe    Accompanying signs and symptoms: None    History (predisposing factors):  none    Precipitating or alleviating factors: None    Therapies tried and outcome:  rest and fluids antihistamine acetaminophen       SUBJECTIVE:    Yamel is a 56 year old female presenting with nasal congestion, post nasal drip, headache, tooth pain and facial pressure.  Predisposing factors include allergies  Hx recurrent sinustitis    Patient Active Problem List   Diagnosis     Mild intermittent asthma without complication     CARDIOVASCULAR SCREENING; LDL GOAL LESS THAN 160     Chronic maxillary sinusitis     Major depressive disorder, recurrent episode, mild (H)     Diagnostic skin and sensitization tests     Allergic rhinitis due to animal dander     House dust mite allergy     Allergy to mold spores     Post-operative pain     Vulvar cancer (H)     Hypertension goal BP (blood pressure) < 140/90     Asymptomatic varicose veins     Gastroesophageal reflux disease without esophagitis     Hip pain, right     Seasonal allergic rhinitis     Osteoarthritis of right hip, unspecified osteoarthritis type     Arthritis of right hip     S/P total hip arthroplasty     Primary osteoarthritis of left shoulder       Current Outpatient Prescriptions   Medication Sig Dispense Refill     fluticasone (FLOVENT HFA) 110 MCG/ACT Inhaler Inhale 2 puffs into the lungs 2 times daily 3 Inhaler 1     amoxicillin-clavulanate (AUGMENTIN) 500-125 MG per tablet Take 1 tablet by mouth 2 times daily 20 tablet 0     FLUoxetine (PROZAC) 20 MG  capsule TAKE ONE CAPSULE BY MOUTH EVERY DAY 90 capsule 1     FLUoxetine (PROZAC) 40 MG capsule TAKE ONE CAPSULE BY MOUTH EVERY DAY WITH 20MG CAPSULE 90 capsule 1     omeprazole (PRILOSEC) 40 MG capsule TAKE ONE CAPSULE BY MOUTH EVERY DAY 30-60 MINUTES BEFORE A MEAL-PATIENT REQUESTS SANDOZ BRAND (NO FURTHER REFILLS WITHOUT OFFICE VISIT) 90 capsule 1     ondansetron (ZOFRAN-ODT) 4 MG ODT tab DISSOLVE TWO TABLETS ON TONGUE EVERY 8 HOURS AS NEEDED FOR NAUSEA 60 tablet 0     acetaminophen 650 MG 8 hour tablet Take 650 mg by mouth every 4 hours as needed for other (surgical pain) 100 tablet 1     CALCIUM-MAGNESIUM-ZINC PO 1 tablet daily       ALBUTEROL 108 (90 BASE) MCG/ACT inhaler INHALE ONE TO TWO PUFFS BY MOUTH FOUR TIMES A DAY AS NEEDED 8.5 g 5     Multiple Vitamins-Minerals (CENTRUM SILVER PO) Take  by mouth.       ZYRTEC 10 MG OR TABS ONE TABLET DAILY 1 month 2     [DISCONTINUED] fluticasone (FLOVENT HFA) 110 MCG/ACT Inhaler Inhale 2 puffs into the lungs 2 times daily 3 Inhaler 1       Allergies   Allergen Reactions     Latex Itching     Lisinopril      Cough     Red Dye        History   Smoking Status     Former Smoker     Packs/day: 0.50     Quit date: 1/1/2013   Smokeless Tobacco     Never Used     Comment: Quit 3 yrs ago, however smokes up to 5 cigarettes weekly       OBJECTIVE:  General appearance: nad  Skin color is nl  Hydration status appears adequate with normal skin turgor and moist mucous membranes.    HEENT:   Left TM is nl  Right TM is nl  Throat is nl  Neck is supple w/o adenopathy    CARDIAC:nl  LUNGS: clear  ABDOMEN: ne    CXR Findings: ne    ASSESSMENT:    Acute  Recurrent sinusitis    PLAN: extra fluids, humidity, otc decongestants, call back if no improvement in 4-5 days, sooner if increasing sx.    rx augmentin

## 2017-05-04 NOTE — MR AVS SNAPSHOT
"              After Visit Summary   2017    Yamel Martinez    MRN: [id number]           Patient Information     Date Of Birth          1960        Visit Information        Provider Department      2017 9:30 AM Heron Fisher MD [facility]        Today's Diagnoses     Acute recurrent maxillary sinusitis    -  1    Mild intermittent asthma without complication           Follow-ups after your visit        Who to contact     If you have questions or need follow up information about today's clinic visit or your schedule please contact [facility] directly at [phone].  Normal or non-critical lab and imaging results will be communicated to you by [email or web address], letter or phone within 4 business days after the clinic has received the results. If you do not hear from us within 7 days, please contact the clinic through [email or web address] or phone. If you have a critical or abnormal lab result, we will notify you by phone as soon as possible.  Submit refill requests through SocialBrowse or call your pharmacy and they will forward the refill request to us. Please allow 3 business days for your refill to be completed.          Additional Information About Your Visit        MyChart Information     SocialBrowse lets you send messages to your doctor, view your test results, renew your prescriptions, schedule appointments and more. To sign up, go to [email or web address] . Click on \"Log in\" on the left side of the screen, which will take you to the Welcome page. Then click on \"Sign up Now\" on the right side of the page.     You will be asked to enter the access code listed below, as well as some personal information. Please follow the directions to create your username and password.     Your access code is: [email or web address]  Expires: 2017  9:58 AM     Your access code will  in 90 days. If you need help or a new code, please call your [facility] or [phone].        Care EveryWhere ID     This is your " Care EveryWhere ID. This could be used by other organizations to access your [facility] medical records  [id number]        Your Vitals Were     Pulse Temperature Respirations Last Period BMI (Body Mass Index)       80 97.6  F (36.4  C) (Temporal) 20 07/20/2010 (Approximate) 33.73 kg/m2        Blood Pressure from Last 3 Encounters:   05/04/17 118/82   02/08/17 130/82   01/16/17 122/70    Weight from Last 3 Encounters:   05/04/17 202 lb 11.2 oz (91.9 kg)   02/08/17 195 lb 4.8 oz (88.6 kg)   01/16/17 200 lb 12.8 oz (91.1 kg)              Today, you had the following     No orders found for display         Today's Medication Changes          These changes are accurate as of: 5/4/17  9:58 AM.  If you have any questions, ask your nurse or doctor.               Start taking these medicines.        Dose/Directions    amoxicillin-clavulanate 500-125 MG per tablet   Commonly known as:  AUGMENTIN   Used for:  Acute recurrent maxillary sinusitis   Started by:  Heron Fisher MD        Dose:  1 tablet   Take 1 tablet by mouth 2 times daily   Quantity:  20 tablet   Refills:  0            Where to get your medicines      These medications were sent to [facility] [address]   [address] [facility] [address]     Phone:  [phone]     amoxicillin-clavulanate 500-125 MG per tablet                Primary Care Provider Office Phone # Fax #    Albert Salmeron PA-C [phone]       [facility] [address] [facility] [address]        Thank you!     Thank you for choosing [facility]  for your care. Our goal is always to provide you with excellent care. Hearing back from our patients is one way we can continue to improve our services. Please take a few minutes to complete the written survey that you may receive in the mail after your visit with us. Thank you!             Your Updated Medication List - Protect others around you: Learn how to safely use, store and  throw away your medicines at www.disposemymeds.org.          This list is accurate as of: 5/4/17  9:58 AM.  Always use your most recent med list.                   Brand Name Dispense Instructions for use    acetaminophen 650 MG 8 hour tablet     100 tablet    Take 650 mg by mouth every 4 hours as needed for other (surgical pain)       albuterol 108 (90 BASE) MCG/ACT Inhaler   Generic drug:  albuterol     8.5 g    INHALE ONE TO TWO PUFFS BY MOUTH FOUR TIMES A DAY AS NEEDED       amoxicillin-clavulanate 500-125 MG per tablet    AUGMENTIN    20 tablet    Take 1 tablet by mouth 2 times daily       CALCIUM-MAGNESIUM-ZINC PO      1 tablet daily       CENTRUM SILVER PO      Take  by mouth.       FLOVENT  MCG/ACT Inhaler   Generic drug:  fluticasone     3 Inhaler    Inhale 2 puffs into the lungs 2 times daily       * FLUoxetine 20 MG capsule    PROzac    90 capsule    TAKE ONE CAPSULE BY MOUTH EVERY DAY       * FLUoxetine 40 MG capsule    PROzac    90 capsule    TAKE ONE CAPSULE BY MOUTH EVERY DAY WITH 20MG CAPSULE       omeprazole 40 MG capsule    priLOSEC    90 capsule    TAKE ONE CAPSULE BY MOUTH EVERY DAY 30-60 MINUTES BEFORE A MEAL-PATIENT REQUESTS SANDOZ BRAND (NO FURTHER REFILLS WITHOUT OFFICE VISIT)       ondansetron 4 MG ODT tab    ZOFRAN-ODT    60 tablet    DISSOLVE TWO TABLETS ON TONGUE EVERY 8 HOURS AS NEEDED FOR NAUSEA       ZYRTEC 10 MG tablet   Generic drug:  cetirizine     1 month    ONE TABLET DAILY       * Notice:  This list has 2 medication(s) that are the same as other medications prescribed for you. Read the directions carefully, and ask your doctor or other care provider to review them with you.

## 2017-05-31 ENCOUNTER — TELEPHONE (OUTPATIENT)
Dept: FAMILY MEDICINE | Facility: OTHER | Age: 57
End: 2017-05-31

## 2017-05-31 NOTE — TELEPHONE ENCOUNTER
Spoke to the patient. She said on Saturday she fell on her boat. She said since then, she has had pain all over her body. She said she has a lot of pain up by her ribs. She said she has arm pain and pain under her breasts. Patient said she has also noticed trouble breathing since Saturday. She said she has a lot of pain with taking deep breaths. She also said she has a cough with wheezing. Patient said she cant sleep and seems to be coughing up a lot of crud.     Patient denies: chest pain, blue lips/tongue, clammy skin, feeling of suffocation, frothy pink sputum/white, altered mental status, severe SOB, HX blood clots/lung collapse, severe wheezing, inability to speak, drooling, or trouble breathing after inhaling smoke/flames.     RECOMMENDED DISPOSITION:  To ED, another person to drive - since patient has pain when taking a deep breath, RN advised she should be seen in the ED. Patient does not want to go to the ED. She requested an appointment in clinic. RN did offer an afternoon appointment today but patient declined. She wanted to really see Dr. Fisher and asked about an appointment tomorrow. RN did find patient an appointment with Dr. Fisher tomorrow morning but did advise the ED today would be recommended. Patient refused the ED.   Will comply with recommendation: no - patient refused the ED. She is scheduled tomorrow morning.    If further questions/concerns or if Sx do not improve, worsen or new Sx develop, call your PCP or [facility] as soon as possible.    NOTES:  Disposition was determined by the first positive assessment question, therefore all previous assessment questions were negative.  Informed to check provider manual or call insurance company to assure coverage.    Guideline used: Breathing Problems  Telephone Triage Protocols for Nurses, Fifth Edition, Magali Mckeon RN

## 2017-06-01 ENCOUNTER — OFFICE VISIT (OUTPATIENT)
Dept: FAMILY MEDICINE | Facility: OTHER | Age: 57
End: 2017-06-01
Payer: COMMERCIAL

## 2017-06-01 ENCOUNTER — RADIANT APPOINTMENT (OUTPATIENT)
Dept: GENERAL RADIOLOGY | Facility: OTHER | Age: 57
End: 2017-06-01
Attending: FAMILY MEDICINE
Payer: COMMERCIAL

## 2017-06-01 VITALS
SYSTOLIC BLOOD PRESSURE: 126 MMHG | RESPIRATION RATE: 20 BRPM | HEART RATE: 72 BPM | WEIGHT: 200.9 LBS | DIASTOLIC BLOOD PRESSURE: 82 MMHG | BODY MASS INDEX: 33.43 KG/M2 | TEMPERATURE: 97.8 F

## 2017-06-01 DIAGNOSIS — S20.211A CHEST WALL CONTUSION, RIGHT, INITIAL ENCOUNTER: ICD-10-CM

## 2017-06-01 DIAGNOSIS — S20.211A CHEST WALL CONTUSION, RIGHT, INITIAL ENCOUNTER: Primary | ICD-10-CM

## 2017-06-01 PROCEDURE — 71101 X-RAY EXAM UNILAT RIBS/CHEST: CPT | Mod: RT

## 2017-06-01 PROCEDURE — 99213 OFFICE O/P EST LOW 20 MIN: CPT | Performed by: FAMILY MEDICINE

## 2017-06-01 RX ORDER — HYDROCODONE BITARTRATE AND ACETAMINOPHEN 5; 325 MG/1; MG/1
1 TABLET ORAL EVERY 4 HOURS PRN
Qty: 20 TABLET | Refills: 0 | Status: SHIPPED | OUTPATIENT
Start: 2017-06-01 | End: 2017-08-01

## 2017-06-01 ASSESSMENT — PAIN SCALES - GENERAL: PAINLEVEL: EXTREME PAIN (8)

## 2017-06-01 NOTE — NURSING NOTE
"Chief Complaint   Patient presents with     Fall     fell on 05/28/17 injuring rt arm,ribs,back and lt ankle       Initial /82 (BP Location: Left arm, Patient Position: Chair, Cuff Size: Adult Large)  Pulse 72  Temp 97.8  F (36.6  C) (Temporal)  Resp 20  Wt 200 lb 14.4 oz (91.1 kg)  LMP 07/20/2010 (Approximate)  BMI 33.43 kg/m2 Estimated body mass index is 33.43 kg/(m^2) as calculated from the following:    Height as of 1/16/17: 5' 5\" (1.651 m).    Weight as of this encounter: 200 lb 14.4 oz (91.1 kg).  Medication Reconciliation: complete  "

## 2017-06-01 NOTE — MR AVS SNAPSHOT
"              After Visit Summary   2017    Yamel Martinez    MRN: [id number]           Patient Information     Date Of Birth          1960        Visit Information        Provider Department      2017 9:30 AM Heron Fisher MD [facility]        Today's Diagnoses     Chest wall contusion, right, initial encounter    -  1       Follow-ups after your visit        Who to contact     If you have questions or need follow up information about today's clinic visit or your schedule please contact [facility] directly at [phone].  Normal or non-critical lab and imaging results will be communicated to you by [employer], letter or phone within 4 business days after the clinic has received the results. If you do not hear from us within 7 days, please contact the clinic through [employer] or phone. If you have a critical or abnormal lab result, we will notify you by phone as soon as possible.  Submit refill requests through [employer] or call your pharmacy and they will forward the refill request to us. Please allow 3 business days for your refill to be completed.          Additional Information About Your Visit        MyChart Information     [employer] lets you send messages to your doctor, view your test results, renew your prescriptions, schedule appointments and more. To sign up, go to [email or web address] . Click on \"Log in\" on the left side of the screen, which will take you to the Welcome page. Then click on \"Sign up Now\" on the right side of the page.     You will be asked to enter the access code listed below, as well as some personal information. Please follow the directions to create your username and password.     Your access code is: [email or web address]  Expires: 2017  9:58 AM     Your access code will  in 90 days. If you need help or a new code, please call your [facility] or [phone].        Care EveryWhere ID     This is your Care EveryWhere ID. This could be used by " other organizations to access your [facility] medical records  [id number]        Your Vitals Were     Pulse Temperature Respirations Last Period BMI (Body Mass Index)       72 97.8  F (36.6  C) (Temporal) 20 07/20/2010 (Approximate) 33.43 kg/m2        Blood Pressure from Last 3 Encounters:   06/01/17 126/82   05/04/17 118/82   02/08/17 130/82    Weight from Last 3 Encounters:   06/01/17 200 lb 14.4 oz (91.1 kg)   05/04/17 202 lb 11.2 oz (91.9 kg)   02/08/17 195 lb 4.8 oz (88.6 kg)                 Today's Medication Changes          These changes are accurate as of: 6/1/17 10:42 AM.  If you have any questions, ask your nurse or doctor.               Start taking these medicines.        Dose/Directions    HYDROcodone-acetaminophen 5-325 MG per tablet   Commonly known as:  NORCO   Used for:  Chest wall contusion, right, initial encounter   Started by:  Heron Fisher MD        Dose:  1 tablet   Take 1 tablet by mouth every 4 hours as needed for pain maximum 6 tablet(s) per day   Quantity:  20 tablet   Refills:  0            Where to get your medicines      Some of these will need a paper prescription and others can be bought over the counter.  Ask your nurse if you have questions.     Bring a paper prescription for each of these medications     HYDROcodone-acetaminophen 5-325 MG per tablet                Primary Care Provider Office Phone # Fax #    Albert Salmeron PA-C [phone]       [facility] [address] [facility] [address]        Thank you!     Thank you for choosing [facility]  for your care. Our goal is always to provide you with excellent care. Hearing back from our patients is one way we can continue to improve our services. Please take a few minutes to complete the written survey that you may receive in the mail after your visit with us. Thank you!             Your Updated Medication List - Protect others around you: Learn how to safely use, store and throw away  your medicines at www.disposemymeds.org.          This list is accurate as of: 6/1/17 10:42 AM.  Always use your most recent med list.                   Brand Name Dispense Instructions for use    acetaminophen 650 MG 8 hour tablet     100 tablet    Take 650 mg by mouth every 4 hours as needed for other (surgical pain)       albuterol 108 (90 BASE) MCG/ACT Inhaler   Generic drug:  albuterol     8.5 g    INHALE ONE TO TWO PUFFS BY MOUTH FOUR TIMES A DAY AS NEEDED       CALCIUM-MAGNESIUM-ZINC PO      1 tablet daily       CENTRUM SILVER PO      Take  by mouth.       FLOVENT  MCG/ACT Inhaler   Generic drug:  fluticasone     3 Inhaler    Inhale 2 puffs into the lungs 2 times daily       * FLUoxetine 20 MG capsule    PROzac    90 capsule    TAKE ONE CAPSULE BY MOUTH EVERY DAY       * FLUoxetine 40 MG capsule    PROzac    90 capsule    TAKE ONE CAPSULE BY MOUTH EVERY DAY WITH 20MG CAPSULE       HYDROcodone-acetaminophen 5-325 MG per tablet    NORCO    20 tablet    Take 1 tablet by mouth every 4 hours as needed for pain maximum 6 tablet(s) per day       omeprazole 40 MG capsule    priLOSEC    90 capsule    TAKE ONE CAPSULE BY MOUTH EVERY DAY 30-60 MINUTES BEFORE A MEAL-PATIENT REQUESTS SANDOZ BRAND (NO FURTHER REFILLS WITHOUT OFFICE VISIT)       ondansetron 4 MG ODT tab    ZOFRAN-ODT    60 tablet    DISSOLVE TWO TABLETS ON TONGUE EVERY 8 HOURS AS NEEDED FOR NAUSEA       ZYRTEC 10 MG tablet   Generic drug:  cetirizine     1 month    ONE TABLET DAILY       * Notice:  This list has 2 medication(s) that are the same as other medications prescribed for you. Read the directions carefully, and ask your doctor or other care provider to review them with you.

## 2017-06-01 NOTE — PROGRESS NOTES
SUBJECTIVE:                                                    Yamel Martinez is a 56 year old female who presents to clinic today for the following health issues:  Chief Complaint   Patient presents with     Fall     fell on 05/28/17 injuring rt arm,ribs,back and lt ankle     SUBJECTIVE:  Yamel Martinez, a 56 year old female scheduled an appointment to discuss the following issues:  Chest wall contusion, right, initial encounter   feel in a fishing boat 5 dd ago. Pain rt chest, rt neck, pain with breathing  Past Medical History:   Diagnosis Date     Abnormal Papanicolaou smear of cervix and cervical HPV      Allergic rhinitis due to animal dander      Allergy to mold spores     4/17/12 RAST pos. for dog/DM/M only     Depressive disorder, not elsewhere classified      Diagnostic skin and sensitization tests 4/17/12 RAST pos. for dog/DM/M only     Esophageal reflux 9/9/2015     Hip pain, right 2/2/2016     House dust mite allergy      Obesity, unspecified      Osteoarthritis of right hip, unspecified osteoarthritis type 2/2/2016     Primary osteoarthritis of left shoulder 1/16/2017     Seasonal allergic rhinitis 2/2/2016     Unspecified asthma(493.90)     Asthma     Current Outpatient Prescriptions   Medication Sig Dispense Refill     HYDROcodone-acetaminophen (NORCO) 5-325 MG per tablet Take 1 tablet by mouth every 4 hours as needed for pain maximum 6 tablet(s) per day 20 tablet 0     fluticasone (FLOVENT HFA) 110 MCG/ACT Inhaler Inhale 2 puffs into the lungs 2 times daily 3 Inhaler 1     FLUoxetine (PROZAC) 20 MG capsule TAKE ONE CAPSULE BY MOUTH EVERY DAY 90 capsule 1     FLUoxetine (PROZAC) 40 MG capsule TAKE ONE CAPSULE BY MOUTH EVERY DAY WITH 20MG CAPSULE 90 capsule 1     omeprazole (PRILOSEC) 40 MG capsule TAKE ONE CAPSULE BY MOUTH EVERY DAY 30-60 MINUTES BEFORE A MEAL-PATIENT REQUESTS SANDOZ BRAND (NO FURTHER REFILLS WITHOUT OFFICE VISIT) 90 capsule 1     ondansetron (ZOFRAN-ODT) 4 MG ODT tab DISSOLVE TWO  TABLETS ON TONGUE EVERY 8 HOURS AS NEEDED FOR NAUSEA 60 tablet 0     acetaminophen 650 MG 8 hour tablet Take 650 mg by mouth every 4 hours as needed for other (surgical pain) 100 tablet 1     CALCIUM-MAGNESIUM-ZINC PO 1 tablet daily       ALBUTEROL 108 (90 BASE) MCG/ACT inhaler INHALE ONE TO TWO PUFFS BY MOUTH FOUR TIMES A DAY AS NEEDED 8.5 g 5     Multiple Vitamins-Minerals (CENTRUM SILVER PO) Take  by mouth.       ZYRTEC 10 MG OR TABS ONE TABLET DAILY 1 month 2       REVIEW OF SYSTEMS:         Respiratory: see hpi, no hemoptysis    Cardiovascular: negative    Gastrointestinal: no abd pain    Genitourinary: negative    Musculoskeletal: see hpi      Neurologic: negative     Skin: no bruising obvious     EXAM:  /82 (BP Location: Left arm, Patient Position: Chair, Cuff Size: Adult Large)  Pulse 72  Temp 97.8  F (36.6  C) (Temporal)  Resp 20  Wt 200 lb 14.4 oz (91.1 kg)  LMP 07/20/2010 (Approximate)  BMI 33.43 kg/m2  NECK:  Tight muscles on the rt  LUNGS:  Clear to auscultation. Tender lat and ant. No sq empheysema, no bruising noted  HEART:  Regular rhythm, no murmurs.    ABDOMEN:  The abdomen is soft, nontender, no hepatosplenomegaly or masses appreciated.  EXTREMITIES: neg  NEURO grossly intact.         cxr and ribs neg    IMPRESSION/PLAN:  Encounter Diagnosis   Name Primary?     Chest wall contusion, right, initial encounter Yes       No work next week     Ice     Cant take ndsaids   warned re tylenol maximum  cb if more symptoms  Also discussed hep c imm, she will contemplate having immunization. Info given    Orders Placed This Encounter     XR Ribs & Chest Right G/E 3 Views     HYDROcodone-acetaminophen (NORCO) 5-325 MG per tablet       Heron Fisher

## 2017-06-08 DIAGNOSIS — R11.0 NAUSEA: ICD-10-CM

## 2017-06-08 RX ORDER — ONDANSETRON 4 MG/1
TABLET, ORALLY DISINTEGRATING ORAL
Qty: 60 TABLET | Refills: 0 | Status: SHIPPED | OUTPATIENT
Start: 2017-06-08 | End: 2017-08-01

## 2017-06-08 NOTE — TELEPHONE ENCOUNTER
Zofran      Last Written Prescription Date: 12-  Last Fill Quantity: 60,  # refills: 0   Last Office Visit with G, P or [facility] prescribing provider: 6-1-2017

## 2017-06-08 NOTE — TELEPHONE ENCOUNTER
Prescription approved per [facility] Refill Protocol.    Catrina Mckeon RN  [facility]

## 2017-06-27 DIAGNOSIS — J45.20 MILD INTERMITTENT ASTHMA WITHOUT COMPLICATION: ICD-10-CM

## 2017-06-27 RX ORDER — ALBUTEROL SULFATE 90 UG/1
AEROSOL, METERED RESPIRATORY (INHALATION)
Qty: 8.5 G | Refills: 1 | Status: SHIPPED | OUTPATIENT
Start: 2017-06-27 | End: 2018-05-30

## 2017-06-27 NOTE — TELEPHONE ENCOUNTER
Albuterol       Last Written Prescription Date: 11/12/14  Last Fill Quantity: 8.5 g, # refills: 5    Last Office Visit with G, P or [facility] prescribing provider:  6/1/17   Future Office Visit:       Date of Last Asthma Action Plan Letter:   Asthma Action Plan Q1 Year    Topic Date Due     Asthma Action Plan - yearly  12/19/2017      Asthma Control Test:   ACT Total Scores 1/16/2017   ACT TOTAL SCORE (Goal Greater than or Equal to 20) 22   In the past 12 months, how many times did you visit the emergency room for your asthma without being admitted to the hospital? 0   In the past 12 months, how many times were you hospitalized overnight because of your asthma? 0       Date of Last Spirometry Test:   No results found for this or any previous visit.

## 2017-06-27 NOTE — TELEPHONE ENCOUNTER
Routing refill request to provider for review/approval because:  A break in medication, RX has not been prescribed since 2014    Catrina Mckeon RN  [facility]

## 2017-08-01 ENCOUNTER — OFFICE VISIT (OUTPATIENT)
Dept: FAMILY MEDICINE | Facility: OTHER | Age: 57
End: 2017-08-01
Payer: COMMERCIAL

## 2017-08-01 VITALS
SYSTOLIC BLOOD PRESSURE: 124 MMHG | WEIGHT: 201 LBS | OXYGEN SATURATION: 99 % | TEMPERATURE: 97.3 F | BODY MASS INDEX: 33.45 KG/M2 | RESPIRATION RATE: 20 BRPM | HEART RATE: 78 BPM | DIASTOLIC BLOOD PRESSURE: 82 MMHG

## 2017-08-01 DIAGNOSIS — J01.01 ACUTE RECURRENT MAXILLARY SINUSITIS: Primary | ICD-10-CM

## 2017-08-01 DIAGNOSIS — I10 HYPERTENSION GOAL BP (BLOOD PRESSURE) < 140/90: ICD-10-CM

## 2017-08-01 DIAGNOSIS — K21.9 GASTROESOPHAGEAL REFLUX DISEASE WITHOUT ESOPHAGITIS: ICD-10-CM

## 2017-08-01 DIAGNOSIS — R11.0 NAUSEA: ICD-10-CM

## 2017-08-01 LAB
CREAT UR-MCNC: 28 MG/DL
MICROALBUMIN UR-MCNC: <5 MG/L
MICROALBUMIN/CREAT UR: NORMAL MG/G CR (ref 0–25)

## 2017-08-01 PROCEDURE — 82043 UR ALBUMIN QUANTITATIVE: CPT | Performed by: NURSE PRACTITIONER

## 2017-08-01 PROCEDURE — 99214 OFFICE O/P EST MOD 30 MIN: CPT | Performed by: NURSE PRACTITIONER

## 2017-08-01 RX ORDER — ONDANSETRON 4 MG/1
TABLET, ORALLY DISINTEGRATING ORAL
Qty: 60 TABLET | Refills: 0 | Status: SHIPPED | OUTPATIENT
Start: 2017-08-01 | End: 2017-09-28

## 2017-08-01 RX ORDER — OMEPRAZOLE 40 MG/1
CAPSULE, DELAYED RELEASE ORAL
Qty: 90 CAPSULE | Refills: 1 | Status: SHIPPED | OUTPATIENT
Start: 2017-08-01 | End: 2018-02-07

## 2017-08-01 RX ORDER — FLUTICASONE PROPIONATE 50 MCG
1-2 SPRAY, SUSPENSION (ML) NASAL DAILY
Qty: 1 BOTTLE | Refills: 11 | Status: SHIPPED | OUTPATIENT
Start: 2017-08-01 | End: 2018-07-05

## 2017-08-01 NOTE — PROGRESS NOTES
Letter sent to patient informing of NL lab results.  ................Eric Ramos LPN,   August 1, 2017,      4:32 PM,   [facility]

## 2017-08-01 NOTE — PATIENT INSTRUCTIONS
Take the Augmentin twice a day as prescribed.     Follow up if symptoms fail to resolve as expected.

## 2017-08-01 NOTE — LETTER
Yamel MOORE Martinez  [address] [facility] [address]        August 1, 2017          Dear ,    We are writing to inform you of your test results.    Your test results fall within the expected range(s) or remain unchanged from previous results.  Please continue with current treatment plan.    Resulted Orders   Albumin Random Urine Quantitative   Result Value Ref Range    Creatinine Urine 28 mg/dL    Albumin Urine mg/L <5 mg/L    Albumin Urine mg/g Cr Unable to calculate due to low value 0 - 25 mg/g Cr     If you have any questions or concerns, please call the clinic at the number listed above.     Sincerely,    HCARLENE Cardoso CNP

## 2017-08-01 NOTE — PROGRESS NOTES
SUBJECTIVE:                                                    Yamel Martinez is a 56 year old female who presents to clinic today for the following health issues:      Medication Followup of Zofran and Prilosec    Taking Medication as prescribed: yes    Side Effects:  None    Medication Helping Symptoms:  yes     RESPIRATORY SYMPTOMS      Duration: 3-4 days    Description  nasal congestion, headache and teeth pain    Severity: severe    Accompanying signs and symptoms: tired, jaw pain    History (predisposing factors):  none    Precipitating or alleviating factors: None    Therapies tried and outcome:  otc sinus med didn't help, tylenol doesn't help either      GERD/Heartburn      Duration: chronic    Description (location/character/radiation): GERD    Intensity:  moderate    Accompanying signs and symptoms:  food getting stuck: no   nausea/vomiting/blood: YES - nausea only  abdominal pain: no   black/tarry or bloody stools: no :    History (similar episodes/previous evaluation): None    Precipitating or alleviating factors:  worse with no particular food or drink.  current NSAID/Aspirin use: no     Therapies tried and outcome: Omeprazole (Prilosec) and Zofran are helpful         Problem list and histories reviewed & adjusted, as indicated.  Additional history: none    Patient Active Problem List   Diagnosis     Mild intermittent asthma without complication     CARDIOVASCULAR SCREENING; LDL GOAL LESS THAN 160     Chronic maxillary sinusitis     Major depressive disorder, recurrent episode, mild (H)     Diagnostic skin and sensitization tests     Allergic rhinitis due to animal dander     House dust mite allergy     Allergy to mold spores     Post-operative pain     Vulvar cancer (H)     Hypertension goal BP (blood pressure) < 140/90     Asymptomatic varicose veins     Gastroesophageal reflux disease without esophagitis     Hip pain, right     Seasonal allergic rhinitis     Osteoarthritis of right hip, unspecified  osteoarthritis type     Arthritis of right hip     S/P total hip arthroplasty     Primary osteoarthritis of left shoulder     Past Surgical History:   Procedure Laterality Date     ARTHROPLASTY HIP Right 3/28/2016    Procedure: ARTHROPLASTY HIP;  Surgeon: Juan Espinoza MD;  Location: [facility] OR     C LIGATE FALLOPIAN TUBE       C NONSPECIFIC PROCEDURE      Sinus surgery cyst and septum     C NONSPECIFIC PROCEDURE      Vein closure     HC COLP CERVIX/UPPER VAGINA W LOOP ELEC BX CERVIX  1999     INCISION AND DRAINAGE HIP, COMBINED  2010    Post operative     JOINT REPLACEMENT, HIP RT/LT  2010    Joint Replacement Hip LT     JOINT REPLACEMENT, HIP RT/LT  01/07/13    left total hip arthroplasty     VULVECTOMY RADICAL (NO GROIN DISSECTION)  4/18/2013    Procedure: VULVECTOMY RADICAL (NO GROIN DISSECTION);  Left radical Vulvectomy ;  Surgeon: Gloria Broussard MD;  Location:  OR       Social History   Substance Use Topics     Smoking status: Former Smoker     Packs/day: 0.50     Quit date: 1/1/2013     Smokeless tobacco: Never Used      Comment: Quit 3 yrs ago, however smokes up to 5 cigarettes weekly     Alcohol use 0.0 oz/week     0 Standard drinks or equivalent per week      Comment: 2-3  weekly     Family History   Problem Relation Age of Onset     Anesthesia Reaction No family hx of      Blood Disease No family hx of      C.A.D. No family hx of      DIABETES Paternal Grandmother      DIABETES Father      Hypertension Mother      Breast Cancer Maternal Grandmother      Cancer - colorectal No family hx of      Thyroid Disease Father      cancer of thyroid         Current Outpatient Prescriptions   Medication Sig Dispense Refill     ALBUTEROL 108 (90 BASE) MCG/ACT inhaler INHALE ONE TO TWO PUFFS BY MOUTH FOUR TIMES A DAY AS NEEDED-DUE FOR ASTHMA FOLLOW UP 8.5 g 1     ondansetron (ZOFRAN-ODT) 4 MG ODT tab DISSOLVE TWO TABLETS ON TONGUE EVERY 8 HOURS AS NEEDED FOR NAUSEA 60 tablet 0     fluticasone (FLOVENT HFA) 110  MCG/ACT Inhaler Inhale 2 puffs into the lungs 2 times daily 3 Inhaler 1     FLUoxetine (PROZAC) 20 MG capsule TAKE ONE CAPSULE BY MOUTH EVERY DAY 90 capsule 1     FLUoxetine (PROZAC) 40 MG capsule TAKE ONE CAPSULE BY MOUTH EVERY DAY WITH 20MG CAPSULE 90 capsule 1     omeprazole (PRILOSEC) 40 MG capsule TAKE ONE CAPSULE BY MOUTH EVERY DAY 30-60 MINUTES BEFORE A MEAL-PATIENT REQUESTS SANDOZ BRAND (NO FURTHER REFILLS WITHOUT OFFICE VISIT) 90 capsule 1     acetaminophen 650 MG 8 hour tablet Take 650 mg by mouth every 4 hours as needed for other (surgical pain) 100 tablet 1     CALCIUM-MAGNESIUM-ZINC PO 1 tablet daily       Multiple Vitamins-Minerals (CENTRUM SILVER PO) Take  by mouth.       ZYRTEC 10 MG OR TABS ONE TABLET DAILY 1 month 2     [DISCONTINUED] ALBUTEROL 108 (90 BASE) MCG/ACT inhaler INHALE ONE TO TWO PUFFS BY MOUTH FOUR TIMES A DAY AS NEEDED 8.5 g 5     Allergies   Allergen Reactions     Latex Itching     Lisinopril      Cough     Red Dye      BP Readings from Last 3 Encounters:   08/01/17 124/82   06/01/17 126/82   05/04/17 118/82    Wt Readings from Last 3 Encounters:   08/01/17 201 lb (91.2 kg)   06/01/17 200 lb 14.4 oz (91.1 kg)   05/04/17 202 lb 11.2 oz (91.9 kg)                        Reviewed and updated as needed this visit by clinical staffTobacco  Allergies  Meds  Soc Hx      Reviewed and updated as needed this visit by Provider         ROS:  C: NEGATIVE for fever, chills, change in weight  ENT/MOUTH: POSITIVE for nasal congestion, postnasal drainage, sinus pressure and sore throat and NEGATIVE for fever  RESP:POSITIVE for cough-non productive and NEGATIVE for Hx asthma, Hx COPD, SOB/dyspnea and wheezing  CV: NEGATIVE for chest pain, palpitations or peripheral edema  GI: POSITIVE for heartburn or reflux and Hx GERD and NEGATIVE for abdominal pain epigastric, constipation, diarrhea, vomiting and weight loss    OBJECTIVE:     /82  Pulse 78  Temp 97.3  F (36.3  C) (Tympanic)  Resp 20  Wt  201 lb (91.2 kg)  LMP 07/20/2010 (Approximate)  SpO2 99%  Breastfeeding? No  BMI 33.45 kg/m2  Body mass index is 33.45 kg/(m^2).  GENERAL: healthy, alert and no distress  HENT: normal cephalic/atraumatic, ear canals and TM's normal, nasal mucosa edematous , oral mucous membranes moist, tonsillar erythema and sinuses: maxillary, frontal tenderness on both sides  NECK: no adenopathy, no asymmetry, masses, or scars and thyroid normal to palpation  RESP: no rales , no rhonchi, no wheezes and decreased breath sounds throughout  CV: regular rate and rhythm, normal S1 S2, no S3 or S4, no murmur, click or rub, no peripheral edema and peripheral pulses strong  ABDOMEN: soft, nontender, no hepatosplenomegaly, no masses and bowel sounds normal  MS: no gross musculoskeletal defects noted, no edema    Diagnostic Test Results:  none     ASSESSMENT/PLAN:         1. Acute recurrent maxillary sinusitis  - amoxicillin-clavulanate (AUGMENTIN) 875-125 MG per tablet; Take 1 tablet by mouth 2 times daily for 10 days  Dispense: 20 tablet; Refill: 0  - fluticasone (FLONASE) 50 MCG/ACT spray; Spray 1-2 sprays into both nostrils daily  Dispense: 1 Bottle; Refill: 11    2. Gastroesophageal reflux disease without esophagitis  Chronic, controlled.  No change in treatment plan.   - omeprazole (PRILOSEC) 40 MG capsule; TAKE ONE CAPSULE BY MOUTH EVERY DAY 30-60 MINUTES BEFORE A MEAL-PATIENT REQUESTS SANDOZ BRAND  Dispense: 90 capsule; Refill: 1    3. Nausea  - ondansetron (ZOFRAN-ODT) 4 MG ODT tab; DISSOLVE TWO TABLETS ON TONGUE EVERY 8 HOURS AS NEEDED FOR NAUSEA  Dispense: 60 tablet; Refill: 0    4. Hypertension goal BP (blood pressure) < 140/90  Chronic, controlled.  No change in treatment plan.   - Albumin Random Urine Quantitative    FUTURE APPOINTMENTS:       - Follow-up for annual visit or as needed  See Patient Instructions    CHARLENE Cardoso [facility]

## 2017-08-01 NOTE — MR AVS SNAPSHOT
"              After Visit Summary   8/1/2017    Yamel Martinez    MRN: [id number]           Patient Information     Date Of Birth          1960        Visit Information        Provider Department      8/1/2017 9:00 AM Sheryl Sparks APRN CNP [facility]        Today's Diagnoses     Acute recurrent maxillary sinusitis    -  1    Gastroesophageal reflux disease without esophagitis        Nausea        Hypertension goal BP (blood pressure) < 140/90          Care Instructions    Take the Augmentin twice a day as prescribed.     Follow up if symptoms fail to resolve as expected.               Follow-ups after your visit        Who to contact     If you have questions or need follow up information about today's clinic visit or your schedule please contact [facility] directly at [phone].  Normal or non-critical lab and imaging results will be communicated to you by PetBoxhart, letter or phone within 4 business days after the clinic has received the results. If you do not hear from us within 7 days, please contact the clinic through PetBoxhart or phone. If you have a critical or abnormal lab result, we will notify you by phone as soon as possible.  Submit refill requests through JumpTime or call your pharmacy and they will forward the refill request to us. Please allow 3 business days for your refill to be completed.          Additional Information About Your Visit        MyChart Information     JumpTime lets you send messages to your doctor, view your test results, renew your prescriptions, schedule appointments and more. To sign up, go to [email or web address] . Click on \"Log in\" on the left side of the screen, which will take you to the Welcome page. Then click on \"Sign up Now\" on the right side of the page.     You will be asked to enter the access code listed below, as well as some personal information. Please follow the directions to create your username and password.     Your access " code is: [email or web address]  Expires: 2017  9:58 AM     Your access code will  in 90 days. If you need help or a new code, please call your [facility] clinic or [phone].        Care EveryWhere ID     This is your Care EveryWhere ID. This could be used by other organizations to access your [facility] medical records  [id number]        Your Vitals Were     Pulse Temperature Respirations Last Period Pulse Oximetry Breastfeeding?    78 97.3  F (36.3  C) (Tympanic) 20 2010 (Approximate) 99% No    BMI (Body Mass Index)                   33.45 kg/m2            Blood Pressure from Last 3 Encounters:   17 124/82   17 126/82   17 118/82    Weight from Last 3 Encounters:   17 201 lb (91.2 kg)   17 200 lb 14.4 oz (91.1 kg)   17 202 lb 11.2 oz (91.9 kg)              We Performed the Following     Albumin Random Urine Quantitative          Today's Medication Changes          These changes are accurate as of: 17  9:12 AM.  If you have any questions, ask your nurse or doctor.               Start taking these medicines.        Dose/Directions    amoxicillin-clavulanate 875-125 MG per tablet   Commonly known as:  AUGMENTIN   Used for:  Acute recurrent maxillary sinusitis   Started by:  Sheryl Sparks APRN CNP        Dose:  1 tablet   Take 1 tablet by mouth 2 times daily for 10 days   Quantity:  20 tablet   Refills:  0       fluticasone 50 MCG/ACT spray   Commonly known as:  FLONASE   Used for:  Acute recurrent maxillary sinusitis   Started by:  Sheryl Sparks APRN CNP        Dose:  1-2 spray   Spray 1-2 sprays into both nostrils daily   Quantity:  1 Bottle   Refills:  11         These medicines have changed or have updated prescriptions.        Dose/Directions    omeprazole 40 MG capsule   Commonly known as:  priLOSEC   This may have changed:  additional instructions   Used for:  Gastroesophageal reflux disease without esophagitis   Changed by:  Sheryl Sparks APRN CNP         TAKE ONE CAPSULE BY MOUTH EVERY DAY 30-60 MINUTES BEFORE A MEAL-PATIENT REQUESTS SANDOZ BRAND   Quantity:  90 capsule   Refills:  1       ondansetron 4 MG ODT tab   Commonly known as:  ZOFRAN-ODT   This may have changed:  See the new instructions.   Used for:  Nausea   Changed by:  Sheryl Sparks APRN CNP        DISSOLVE TWO TABLETS ON TONGUE EVERY 8 HOURS AS NEEDED FOR NAUSEA   Quantity:  60 tablet   Refills:  0            Where to get your medicines      These medications were sent to [facility] [place], [place] - [address]   [address] [facility] [address]     Phone:  [phone]     amoxicillin-clavulanate 875-125 MG per tablet    fluticasone 50 MCG/ACT spray    omeprazole 40 MG capsule    ondansetron 4 MG ODT tab                Primary Care Provider Office Phone # Fax #    Albert Salmeron PA-C [phone]       [facility] [address] [facility] [address]        Equal Access to Services     RICK SEGURA AH: Hadii aad ku hadasho Soomaali, waaxda luqadaha, qaybta kaalmada adeegyada, waxay idiin hayaan leidyeg macy swanson . [facility] [phone].    ATENCIÓN: Si habla español, tiene a solis disposición servicios gratuitos de asistencia lingüística. [facility] [phone].    We comply with applicable federal civil rights laws and [place] laws. We do not discriminate on the basis of race, color, national origin, age, disability sex, sexual orientation or gender identity.            Thank you!     Thank you for choosing [facility]  for your care. Our goal is always to provide you with excellent care. Hearing back from our patients is one way we can continue to improve our services. Please take a few minutes to complete the written survey that you may receive in the mail after your visit with us. Thank you!             Your Updated Medication List - Protect others around you: Learn how to safely use, store and throw away your medicines at www.disposemymeds.org.           This list is accurate as of: 8/1/17  9:12 AM.  Always use your most recent med list.                   Brand Name Dispense Instructions for use Diagnosis    acetaminophen 650 MG 8 hour tablet     100 tablet    Take 650 mg by mouth every 4 hours as needed for other (surgical pain)    Status post total replacement of right hip       albuterol 108 (90 BASE) MCG/ACT Inhaler   Generic drug:  albuterol     8.5 g    INHALE ONE TO TWO PUFFS BY MOUTH FOUR TIMES A DAY AS NEEDED-DUE FOR ASTHMA FOLLOW UP    Mild intermittent asthma without complication       amoxicillin-clavulanate 875-125 MG per tablet    AUGMENTIN    20 tablet    Take 1 tablet by mouth 2 times daily for 10 days    Acute recurrent maxillary sinusitis       CALCIUM-MAGNESIUM-ZINC PO      1 tablet daily    Mild intermittent asthma       CENTRUM SILVER PO      Take  by mouth.        FLOVENT  MCG/ACT Inhaler   Generic drug:  fluticasone     3 Inhaler    Inhale 2 puffs into the lungs 2 times daily    Mild intermittent asthma without complication       * FLUoxetine 20 MG capsule    PROzac    90 capsule    TAKE ONE CAPSULE BY MOUTH EVERY DAY    Major depressive disorder, recurrent episode, mild (H)       * FLUoxetine 40 MG capsule    PROzac    90 capsule    TAKE ONE CAPSULE BY MOUTH EVERY DAY WITH 20MG CAPSULE    Major depressive disorder, recurrent episode, mild (H)       fluticasone 50 MCG/ACT spray    FLONASE    1 Bottle    Spray 1-2 sprays into both nostrils daily    Acute recurrent maxillary sinusitis       omeprazole 40 MG capsule    priLOSEC    90 capsule    TAKE ONE CAPSULE BY MOUTH EVERY DAY 30-60 MINUTES BEFORE A MEAL-PATIENT REQUESTS SANDSunFunder BRAND    Gastroesophageal reflux disease without esophagitis       ondansetron 4 MG ODT tab    ZOFRAN-ODT    60 tablet    DISSOLVE TWO TABLETS ON TONGUE EVERY 8 HOURS AS NEEDED FOR NAUSEA    Nausea       ZYRTEC 10 MG tablet   Generic drug:  cetirizine     1 month    ONE TABLET DAILY    Allergic rhinitis,  cause unspecified       * Notice:  This list has 2 medication(s) that are the same as other medications prescribed for you. Read the directions carefully, and ask your doctor or other care provider to review them with you.

## 2017-08-01 NOTE — NURSING NOTE
"Chief Complaint   Patient presents with     Sinus Problem     x3-4 days     Recheck Medication     prilosec, zofran       Initial /82  Pulse 78  Temp 97.3  F (36.3  C) (Tympanic)  Resp 20  Wt 201 lb (91.2 kg)  LMP 07/20/2010 (Approximate)  SpO2 99%  Breastfeeding? No  BMI 33.45 kg/m2 Estimated body mass index is 33.45 kg/(m^2) as calculated from the following:    Height as of 1/16/17: 5' 5\" (1.651 m).    Weight as of this encounter: 201 lb (91.2 kg).  Medication Reconciliation: complete   ................Eric Ramos LPN,   August 1, 2017,      8:56 AM,   [facility]    "

## 2017-08-02 ASSESSMENT — ASTHMA QUESTIONNAIRES: ACT_TOTALSCORE: 21

## 2017-08-02 ASSESSMENT — PATIENT HEALTH QUESTIONNAIRE - PHQ9: SUM OF ALL RESPONSES TO PHQ QUESTIONS 1-9: 5

## 2017-08-23 DIAGNOSIS — F33.0 MAJOR DEPRESSIVE DISORDER, RECURRENT EPISODE, MILD (H): ICD-10-CM

## 2017-08-23 RX ORDER — FLUOXETINE 40 MG/1
CAPSULE ORAL
Qty: 90 CAPSULE | Refills: 1 | Status: SHIPPED | OUTPATIENT
Start: 2017-08-23 | End: 2018-03-15

## 2017-08-23 NOTE — TELEPHONE ENCOUNTER
Prozac     Last Written Prescription Date: 2-2-2017  Last Fill Quantity: 90, # refills: 1  Last Office Visit with [facility] primary care provider:  8-1-2017        Last PHQ-9 score on record=   PHQ-9 SCORE 8/1/2017   Total Score -   Total Score 5

## 2017-08-23 NOTE — TELEPHONE ENCOUNTER
Routing refill request to provider for review/approval because:  PHQ-9 >4    Catrina Mckeon, RN  [facility]

## 2017-09-28 ENCOUNTER — TELEPHONE (OUTPATIENT)
Dept: FAMILY MEDICINE | Facility: OTHER | Age: 57
End: 2017-09-28

## 2017-09-28 ENCOUNTER — OFFICE VISIT (OUTPATIENT)
Dept: FAMILY MEDICINE | Facility: OTHER | Age: 57
End: 2017-09-28
Payer: COMMERCIAL

## 2017-09-28 VITALS
RESPIRATION RATE: 20 BRPM | BODY MASS INDEX: 34.28 KG/M2 | OXYGEN SATURATION: 100 % | WEIGHT: 206 LBS | DIASTOLIC BLOOD PRESSURE: 76 MMHG | HEART RATE: 81 BPM | SYSTOLIC BLOOD PRESSURE: 124 MMHG | TEMPERATURE: 97.1 F

## 2017-09-28 DIAGNOSIS — E66.9 OBESITY, UNSPECIFIED OBESITY SEVERITY, UNSPECIFIED OBESITY TYPE: ICD-10-CM

## 2017-09-28 DIAGNOSIS — R11.0 NAUSEA: ICD-10-CM

## 2017-09-28 DIAGNOSIS — Z23 NEED FOR PROPHYLACTIC VACCINATION AND INOCULATION AGAINST INFLUENZA: ICD-10-CM

## 2017-09-28 DIAGNOSIS — M65.311 TRIGGER FINGER OF RIGHT THUMB: ICD-10-CM

## 2017-09-28 DIAGNOSIS — M19.041 PRIMARY OSTEOARTHRITIS OF BOTH HANDS: Primary | ICD-10-CM

## 2017-09-28 DIAGNOSIS — M19.042 PRIMARY OSTEOARTHRITIS OF BOTH HANDS: Primary | ICD-10-CM

## 2017-09-28 PROCEDURE — 90471 IMMUNIZATION ADMIN: CPT | Performed by: NURSE PRACTITIONER

## 2017-09-28 PROCEDURE — 99214 OFFICE O/P EST MOD 30 MIN: CPT | Mod: 25 | Performed by: NURSE PRACTITIONER

## 2017-09-28 PROCEDURE — 90686 IIV4 VACC NO PRSV 0.5 ML IM: CPT | Performed by: NURSE PRACTITIONER

## 2017-09-28 RX ORDER — ONDANSETRON 4 MG/1
TABLET, ORALLY DISINTEGRATING ORAL
Qty: 60 TABLET | Refills: 0 | Status: SHIPPED | OUTPATIENT
Start: 2017-09-28 | End: 2018-04-04

## 2017-09-28 ASSESSMENT — PAIN SCALES - GENERAL: PAINLEVEL: SEVERE PAIN (6)

## 2017-09-28 NOTE — PROGRESS NOTES
"  SUBJECTIVE:   Yamel Martinez is a 56 year old female who presents to clinic today for the following health issues:      Musculoskeletal problem/pain      Duration: 2 months    Description  Location: bilateral hands/fingers/thumbs    Intensity:  6/10    Accompanying signs and symptoms: none    History  Previous similar problem: no   Previous evaluation:  none    Precipitating or alleviating factors:  Trauma or overuse: no   Aggravating factors include: pain/stiffness upon waking in morning, right thumb will get \"stuck\" in place sometimes.     Therapies tried and outcome: exercising the fingers    Weight  -question about a weight loss medications. She is s/p gastric sleeve 3 years ago.  Was 315 lbs at her highest weight.  Down to 195 lbs after surgery.  Now weight is creeping up again - at 206 lbs today.  She is worried she will gain all the weight back.  Admits she is an emotional eater at times.  Has been using her stationary bike at home.        Flu shot  -wants flu shot today      Problem list and histories reviewed & adjusted, as indicated.  Additional history: none    Patient Active Problem List   Diagnosis     Mild intermittent asthma without complication     CARDIOVASCULAR SCREENING; LDL GOAL LESS THAN 160     Chronic maxillary sinusitis     Major depressive disorder, recurrent episode, mild (H)     Diagnostic skin and sensitization tests     Allergic rhinitis due to animal dander     House dust mite allergy     Allergy to mold spores     Post-operative pain     Vulvar cancer (H)     Hypertension goal BP (blood pressure) < 140/90     Asymptomatic varicose veins     Gastroesophageal reflux disease without esophagitis     Hip pain, right     Seasonal allergic rhinitis     Osteoarthritis of right hip, unspecified osteoarthritis type     Arthritis of right hip     S/P total hip arthroplasty     Primary osteoarthritis of left shoulder     Past Surgical History:   Procedure Laterality Date     ARTHROPLASTY HIP Right " 3/28/2016    Procedure: ARTHROPLASTY HIP;  Surgeon: Juan Espinoza MD;  Location: [facility] OR     C LIGATE FALLOPIAN TUBE       C NONSPECIFIC PROCEDURE      Sinus surgery cyst and septum     C NONSPECIFIC PROCEDURE      Vein closure     HC COLP CERVIX/UPPER VAGINA W LOOP ELEC BX CERVIX  1999     INCISION AND DRAINAGE HIP, COMBINED  2010    Post operative     JOINT REPLACEMENT, HIP RT/LT  2010    Joint Replacement Hip LT     JOINT REPLACEMENT, HIP RT/LT  01/07/13    left total hip arthroplasty     VULVECTOMY RADICAL (NO GROIN DISSECTION)  4/18/2013    Procedure: VULVECTOMY RADICAL (NO GROIN DISSECTION);  Left radical Vulvectomy ;  Surgeon: Gloria Broussard MD;  Location: [facility] OR       Social History   Substance Use Topics     Smoking status: Former Smoker     Packs/day: 0.50     Quit date: 1/1/2013     Smokeless tobacco: Never Used      Comment: Quit 3 yrs ago, however smokes up to 5 cigarettes weekly     Alcohol use 0.0 oz/week     0 Standard drinks or equivalent per week      Comment: 2-3  weekly     Family History   Problem Relation Age of Onset     Anesthesia Reaction No family hx of      Blood Disease No family hx of      C.A.D. No family hx of      DIABETES Paternal Grandmother      DIABETES Father      Hypertension Mother      Breast Cancer Maternal Grandmother      Cancer - colorectal No family hx of      Thyroid Disease Father      cancer of thyroid         Current Outpatient Prescriptions   Medication Sig Dispense Refill     FLUoxetine (PROZAC) 40 MG capsule TAKE ONE CAPSULE BY MOUTH EVERY DAY WITH 20MG CAPSULE 90 capsule 1     FLUoxetine (PROZAC) 20 MG capsule TAKE ONE CAPSULE BY MOUTH EVERY DAY 90 capsule 1     omeprazole (PRILOSEC) 40 MG capsule TAKE ONE CAPSULE BY MOUTH EVERY DAY 30-60 MINUTES BEFORE A MEAL-PATIENT REQUESTS SANDOZ BRAND 90 capsule 1     ondansetron (ZOFRAN-ODT) 4 MG ODT tab DISSOLVE TWO TABLETS ON TONGUE EVERY 8 HOURS AS NEEDED FOR NAUSEA 60 tablet 0     fluticasone (FLONASE) 50 MCG/ACT  spray Spray 1-2 sprays into both nostrils daily 1 Bottle 11     ALBUTEROL 108 (90 BASE) MCG/ACT inhaler INHALE ONE TO TWO PUFFS BY MOUTH FOUR TIMES A DAY AS NEEDED-DUE FOR ASTHMA FOLLOW UP 8.5 g 1     CALCIUM-MAGNESIUM-ZINC PO 1 tablet daily       Multiple Vitamins-Minerals (CENTRUM SILVER PO) Take  by mouth.       ZYRTEC 10 MG OR TABS ONE TABLET DAILY 1 month 2     [DISCONTINUED] FLUoxetine (PROZAC) 20 MG capsule TAKE ONE CAPSULE BY MOUTH EVERY DAY 90 capsule 1     [DISCONTINUED] FLUoxetine (PROZAC) 40 MG capsule TAKE ONE CAPSULE BY MOUTH EVERY DAY WITH 20MG CAPSULE 90 capsule 1     Allergies   Allergen Reactions     Latex Itching     Lisinopril      Cough     Red Dye      BP Readings from Last 3 Encounters:   09/28/17 124/76   08/01/17 124/82   06/01/17 126/82    Wt Readings from Last 3 Encounters:   09/28/17 206 lb (93.4 kg)   08/01/17 201 lb (91.2 kg)   06/01/17 200 lb 14.4 oz (91.1 kg)             Reviewed and updated as needed this visit by clinical staffTobacco  Allergies  Meds  Soc Hx      Reviewed and updated as needed this visit by Provider         ROS:  C: NEGATIVE for fever, chills, change in weight  E/M: NEGATIVE for ear, mouth and throat problems  R: NEGATIVE for significant cough or SOB  CV: NEGATIVE for chest pain, palpitations or peripheral edema  MUSCULOSKELETAL: as above     OBJECTIVE:     /76  Pulse 81  Temp 97.1  F (36.2  C) (Tympanic)  Resp 20  Wt 206 lb (93.4 kg)  LMP 07/20/2010 (Approximate)  SpO2 100%  Breastfeeding? No  BMI 34.28 kg/m2  Body mass index is 34.28 kg/(m^2).  GENERAL: alert, no distress and obese  RESP: lungs clear to auscultation - no rales, rhonchi or wheezes  CV: regular rate and rhythm, normal S1 S2, no S3 or S4, no murmur, click or rub, no peripheral edema and peripheral pulses strong  MS: no gross musculoskeletal defects noted, no edema    Diagnostic Test Results:  none     ASSESSMENT/PLAN:         1. Primary osteoarthritis of both hands  - ORTHOPEDICS  ADULT REFERRAL    2. Trigger finger of right thumb  - ORTHOPEDICS ADULT REFERRAL    3. Obesity, unspecified obesity severity, unspecified obesity type  - naltrexone-bupropion (CONTRAVE) 8-90 MG per 12 hr tablet; Take 1 pill once a day for 1 week, then increase to 1 pill twice a day after that.  Dispense: 60 tablet; Refill: 0  - NUTRITION REFERRAL    4. Need for prophylactic vaccination and inoculation against influenza  - FLU VAC, SPLIT VIRUS IM > 3 YO (QUADRIVALENT) [61269]  - Vaccine Administration, Initial [16984]    5. Nausea  - ondansetron (ZOFRAN-ODT) 4 MG ODT tab; DISSOLVE TWO TABLETS ON TONGUE EVERY 8 HOURS AS NEEDED FOR NAUSEA  Dispense: 60 tablet; Refill: 0    FUTURE APPOINTMENTS:       - Follow-up visit in 1 month for weight check.   See Patient Instructions    CHARLENE Cardoso [facility]

## 2017-09-28 NOTE — TELEPHONE ENCOUNTER
PRIOR AUTHORIZATION REQUIRED ON Contrave 8-90mg TB12  Bin [id number]  Insurance: [employer]  Phone: [phone]  ID: [id number]      Please contact when PA granted/denied.    Thanks,  Britney Becerril, Technician  [facility]  [phone]

## 2017-09-28 NOTE — NURSING NOTE
Prior to injection verified patient identity using patient's name and date of birth.  Per orders of Sheryl Sparks APRN CNP, injection(s) given by Eric Ramos. Patient instructed to remain in clinic for 15-20 minutes afterwards, and to report any adverse reaction to me immediately. VIS given.  ................Eric Ramos LPN,   September 28, 2017,      7:41 AM,   [facility]

## 2017-09-28 NOTE — MR AVS SNAPSHOT
After Visit Summary   9/28/2017    Yamel Martinez    MRN: [id number]           Patient Information     Date Of Birth          1960        Visit Information        Provider Department      9/28/2017 7:00 AM Sheryl Sparks APRN [facility]        Today's Diagnoses     Need for prophylactic vaccination and inoculation against influenza    -  1    Primary osteoarthritis of both hands        Trigger finger of right thumb        Obesity, unspecified obesity severity, unspecified obesity type        Nausea          Care Instructions    Start the Contrave as prescribed.     See the Nutritionist again.     Follow up in 1 month    See orthopedics in [place] for your hands           Follow-ups after your visit        Additional Services     NUTRITION REFERRAL       Your provider has referred you to: [facility]: [facility] [phone]   [email or web address]    Please be aware that coverage of these services is subject to the terms and limitations of your health insurance plan.  Call member services at your health plan with any benefit or coverage questions.      Please bring the following with you to your appointment:    (1) This referral request  (2) Any documents given to you regarding this referral  (3) Any specific questions you have about diet and/or food choices            ORTHOPEDICS ADULT REFERRAL       Your provider has referred you to: [facility]: [facility] [phone]   [email or web address]    Please be aware that coverage of these services is subject to the terms and limitations of your health insurance plan.  Call member services at your health plan with any benefit or coverage questions.      Please bring the following to your appointment:    >>   Any x-rays, CTs or MRIs which have been performed.  Contact the facility where they were done to arrange for  prior to your  "scheduled appointment.    >>   List of current medications   >>   This referral request   >>   Any documents/labs given to you for this referral                  Who to contact     If you have questions or need follow up information about today's clinic visit or your schedule please contact [facility] directly at [phone].  Normal or non-critical lab and imaging results will be communicated to you by MyChart, letter or phone within 4 business days after the clinic has received the results. If you do not hear from us within 7 days, please contact the clinic through [employer] or phone. If you have a critical or abnormal lab result, we will notify you by phone as soon as possible.  Submit refill requests through [employer] or call your pharmacy and they will forward the refill request to us. Please allow 3 business days for your refill to be completed.          Additional Information About Your Visit        MyChart Information     [employer] lets you send messages to your doctor, view your test results, renew your prescriptions, schedule appointments and more. To sign up, go to [email or web address] . Click on \"Log in\" on the left side of the screen, which will take you to the Welcome page. Then click on \"Sign up Now\" on the right side of the page.     You will be asked to enter the access code listed below, as well as some personal information. Please follow the directions to create your username and password.     Your access code is: [email or web address]  Expires: 2017  7:37 AM     Your access code will  in 90 days. If you need help or a new code, please call your [facility] or [phone].        Care EveryWhere ID     This is your Care EveryWhere ID. This could be used by other organizations to access your [facility] medical records  [id number]        Your Vitals Were     Pulse Temperature Respirations Last Period Pulse Oximetry Breastfeeding?    81 97.1  F (36.2  C) (Tympanic) 20 2010 " (Approximate) 100% No    BMI (Body Mass Index)                   34.28 kg/m2            Blood Pressure from Last 3 Encounters:   09/28/17 124/76   08/01/17 124/82   06/01/17 126/82    Weight from Last 3 Encounters:   09/28/17 206 lb (93.4 kg)   08/01/17 201 lb (91.2 kg)   06/01/17 200 lb 14.4 oz (91.1 kg)              We Performed the Following     FLU VAC, SPLIT VIRUS IM > 3 YO (QUADRIVALENT) [63922]     NUTRITION REFERRAL     ORTHOPEDICS ADULT REFERRAL     Vaccine Administration, Initial [20125]          Today's Medication Changes          These changes are accurate as of: 9/28/17  7:37 AM.  If you have any questions, ask your nurse or doctor.               Start taking these medicines.        Dose/Directions    naltrexone-bupropion 8-90 MG per 12 hr tablet   Commonly known as:  CONTRAVE   Used for:  Obesity, unspecified obesity severity, unspecified obesity type   Started by:  Sheryl Sparks APRN CNP        Take 1 pill once a day for 1 week, then increase to 1 pill twice a day after that.   Quantity:  60 tablet   Refills:  0            Where to get your medicines      These medications were sent to [facility] [place] [address]   [address] [facility] [address]     Phone:  [phone]     naltrexone-bupropion 8-90 MG per 12 hr tablet    ondansetron 4 MG ODT tab                Primary Care Provider Office Phone # Fax #    Albert Salmeron PA-C [phone]       [address] [place] [id number]        Equal Access to Services     RICK SEGURA AH: Hadii carlos fonsecao Soprinceali, waaxda luqadaha, qaybta kaalmada adeegyada, chase hawkins. So [facility] [phone].    ATENCIÓN: Si habla español, tiene a solis disposición servicios gratuitos de asistencia lingüística. Llame al [phone].    We comply with applicable federal civil rights laws and [place] laws. We do not discriminate on the basis of race, color, national origin, age, disability sex, sexual  orientation or gender identity.            Thank you!     Thank you for choosing [facility]  for your care. Our goal is always to provide you with excellent care. Hearing back from our patients is one way we can continue to improve our services. Please take a few minutes to complete the written survey that you may receive in the mail after your visit with us. Thank you!             Your Updated Medication List - Protect others around you: Learn how to safely use, store and throw away your medicines at www.disposemymeds.org.          This list is accurate as of: 9/28/17  7:37 AM.  Always use your most recent med list.                   Brand Name Dispense Instructions for use Diagnosis    CALCIUM-MAGNESIUM-ZINC PO      1 tablet daily    Mild intermittent asthma       CENTRUM SILVER PO      Take  by mouth.        * FLUoxetine 40 MG capsule    PROzac    90 capsule    TAKE ONE CAPSULE BY MOUTH EVERY DAY WITH 20MG CAPSULE    Major depressive disorder, recurrent episode, mild (H)       * FLUoxetine 20 MG capsule    PROzac    90 capsule    TAKE ONE CAPSULE BY MOUTH EVERY DAY    Major depressive disorder, recurrent episode, mild (H)       fluticasone 50 MCG/ACT spray    FLONASE    1 Bottle    Spray 1-2 sprays into both nostrils daily    Acute recurrent maxillary sinusitis       naltrexone-bupropion 8-90 MG per 12 hr tablet    CONTRAVE    60 tablet    Take 1 pill once a day for 1 week, then increase to 1 pill twice a day after that.    Obesity, unspecified obesity severity, unspecified obesity type       omeprazole 40 MG capsule    priLOSEC    90 capsule    TAKE ONE CAPSULE BY MOUTH EVERY DAY 30-60 MINUTES BEFORE A MEAL-PATIENT REQUESTS SANDOZ BRAND    Gastroesophageal reflux disease without esophagitis       ondansetron 4 MG ODT tab    ZOFRAN-ODT    60 tablet    DISSOLVE TWO TABLETS ON TONGUE EVERY 8 HOURS AS NEEDED FOR NAUSEA    Nausea       PROAIR  (90 BASE) MCG/ACT Inhaler   Generic drug:  albuterol      8.5 g    INHALE ONE TO TWO PUFFS BY MOUTH FOUR TIMES A DAY AS NEEDED-DUE FOR ASTHMA FOLLOW UP    Mild intermittent asthma without complication       ZYRTEC 10 MG tablet   Generic drug:  cetirizine     1 month    ONE TABLET DAILY    Allergic rhinitis, cause unspecified       * Notice:  This list has 2 medication(s) that are the same as other medications prescribed for you. Read the directions carefully, and ask your doctor or other care provider to review them with you.

## 2017-09-28 NOTE — NURSING NOTE
"Chief Complaint   Patient presents with     Hand Pain     finger/thumb pain, sore in the morning     Flu Shot     Weight Problem     question about a medication       Initial /76  Pulse 81  Temp 97.1  F (36.2  C) (Tympanic)  Resp 20  Wt 206 lb (93.4 kg)  LMP 07/20/2010 (Approximate)  SpO2 100%  Breastfeeding? No  BMI 34.28 kg/m2 Estimated body mass index is 34.28 kg/(m^2) as calculated from the following:    Height as of 1/16/17: 5' 5\" (1.651 m).    Weight as of this encounter: 206 lb (93.4 kg).  Medication Reconciliation: complete   ................Eric Ramos LPN,   September 28, 2017,      7:17 AM,   [facility]    "

## 2017-09-28 NOTE — PROGRESS NOTES
Injectable Influenza Immunization Documentation    1.  Is the person to be vaccinated sick today?   No    2. Does the person to be vaccinated have an allergy to a component   of the vaccine?   No    3. Has the person to be vaccinated ever had a serious reaction   to influenza vaccine in the past?   No    4. Has the person to be vaccinated ever had Guillain-Barré syndrome?   No    Form completed by ................Eric Ramos LPN,   September 28, 2017,      7:22 AM,   [facility]

## 2017-09-28 NOTE — PATIENT INSTRUCTIONS
Start the Contrave as prescribed.     See the Nutritionist again.     Follow up in 1 month    See orthopedics in [place] for your hands

## 2017-10-18 NOTE — TELEPHONE ENCOUNTER
Could you please call insurance or resubmit this Prior Authorization request, I called insurance and they did not receive the last request, thanks.    Sanjana Correa-Technician  [facility]  [phone]

## 2018-01-26 ENCOUNTER — TELEPHONE (OUTPATIENT)
Dept: FAMILY MEDICINE | Facility: OTHER | Age: 58
End: 2018-01-26

## 2018-01-26 NOTE — TELEPHONE ENCOUNTER
I think this is a viral sinusitis and supportive care is indicated.  If she is not feeling any better by Monday, she should make an appointment to be seen to determine if antibiotics are indicated.     Electronically signed by Sheryl Sparks CNP.

## 2018-01-26 NOTE — TELEPHONE ENCOUNTER
Left detailed msg informing pt appt/antibiotics not needed at this point. If not better by Monday to make appt.  ................Eric Ramos LPN,   January 26, 2018,      11:43 AM,   [facility]

## 2018-01-26 NOTE — TELEPHONE ENCOUNTER
Yamel Martinez is a 57 year old female who calls with cough and cold symptoms.  Patient reports that she has just returned from [place] and has been sick the past 3 days.  Reports sinus pressure and pain with clear to white nasal drainage.  Reports throat pain due to drainage in her throat.  She reports some pain with swallowing.  She reports dry barky cough, but feels that she has mucus in her lungs she can't cough up.  She denies fevers, but reports that she is feeling warm with chills.   She has been using OTC cold and cough medications that are not effective for her.  She is requesting a antibiotic due to symptoms.     NURSING ASSESSMENT:  Description:  Cough / Cold  Onset/duration:  Past 3 days.   Precip. factors:  Returned from [place].   Associated symptoms:  Dry barky cough  Improves/worsens symptoms:  OTC medications not helpful.   Pain scale (0-10)   Unable to rate.   Last exam/Treatment:  09/28/2017  Allergies:   Allergies   Allergen Reactions     Latex Itching     Lisinopril      Cough     Red Dye      NURSING PLAN: Routed to provider Yes    RECOMMENDED DISPOSITION:  Patient is requesting antibiotic due to symptoms.  She was encouraged to continue with OTC medications, fluids, saline spray, humidifier.   Will comply with recommendation: Yes  If further questions/concerns or if symptoms do not improve, worsen or new symptoms develop, call your PCP or [facility] as soon as possible.    Guideline used:  Cough / Cold symptoms.   Telephone Triage Protocols for Nurses, Fifth Edition, Magali Mas RN

## 2018-01-29 DIAGNOSIS — J01.01 ACUTE RECURRENT MAXILLARY SINUSITIS: ICD-10-CM

## 2018-01-29 NOTE — TELEPHONE ENCOUNTER
"Spoke with pt and told her that we would need to see her for an office visit to refill this medication. Pt stated \" I have tried to get an office visit several times since then and there are no openings for me to come in.\" She stated \"I even spoke with a supervisor and told her what kind of impression is this for the community\" and \"I guess I'll just go to the hospital so you can get more money cause that's what you want.\" I tried to help schedule an appt at any of the clinics and even offered to see if a provider would work pt in this evening which pt replied \"well that just won't work as I have no way to get there.\" I offered to help schedule her several times an appt even on a different day and pt kept saying \"I have no way there I just want the medication\". Pt then ended the call by saying \"thanks for trying to help\" and hung up.    Batool Ignacio, ELEN ([facility])    "

## 2018-01-29 NOTE — TELEPHONE ENCOUNTER
Requested Prescriptions   Pending Prescriptions Disp Refills     amoxicillin-clavulanate (AUGMENTIN) 875-125 MG per tablet [Pharmacy Med Name: AMOXICILLIN-POT CLAVUL 875-125 TABS] 20 tablet 0     Sig: TAKE ONE TABLET BY MOUTH TWICE A DAY FOR 10 DAYS    There is no refill protocol information for this order        Amoxicillin      Last Written Prescription Date:    Last Fill Quantity: ,   # refills:   Last Office Visit: 9-28-17  Future Office visit:       Routing refill request to provider for review/approval because:  Drug not active on patient's medication list

## 2018-02-01 NOTE — TELEPHONE ENCOUNTER
I am not refilling this without an office visit to determine if it is needed.  She can have any acute or same day slots.  I know there have been some available in [place] this week.        Electronically signed by Sheryl Sparks CNP.

## 2018-02-01 NOTE — TELEPHONE ENCOUNTER
I called the pt, she states she went elsewhere and got this Rx.  ................Eric Ramos LPN,   February 1, 2018,      10:47 AM,   [facility]

## 2018-02-07 DIAGNOSIS — K21.9 GASTROESOPHAGEAL REFLUX DISEASE WITHOUT ESOPHAGITIS: ICD-10-CM

## 2018-02-07 NOTE — TELEPHONE ENCOUNTER
"Requested Prescriptions   Pending Prescriptions Disp Refills     omeprazole (PRILOSEC) 40 MG capsule [Pharmacy Med Name: OMEPRAZOLE 40MG CPDR] 90 capsule 1     Sig: TAKE ONE CAPSULE BY MOUTH EVERY DAY 30-60 MINUTES BEFORE A MEAL --PATIENT REQUESTS SANDOZ BRAND    PPI Protocol Passed    2/7/2018  8:23 AM       Passed - Not on Clopidogrel (unless Pantoprazole ordered)       Passed - No diagnosis of osteoporosis on record       Passed - Recent or future visit with authorizing provider's specialty    Patient had office visit in the last year or has a visit in the next 30 days with authorizing provider.  See \"Patient Info\" tab in inbasket, or \"Choose Columns\" in Meds & Orders section of the refill encounter.            Passed - Patient is age 18 or older       Passed - No active pregnacy on record       Passed - No positive pregnancy test in past 12 months          Last Written Prescription Date:  8/1/17  Last Fill Quantity: 90,  # refills: 1   Last Office Visit with [facility], [facility] or [facility] prescribing provider:  9-28-17   Future Office Visit:       "

## 2018-02-08 RX ORDER — OMEPRAZOLE 40 MG/1
CAPSULE, DELAYED RELEASE ORAL
Qty: 90 CAPSULE | Refills: 1 | Status: SHIPPED | OUTPATIENT
Start: 2018-02-08 | End: 2018-04-04

## 2018-03-15 DIAGNOSIS — F33.0 MAJOR DEPRESSIVE DISORDER, RECURRENT EPISODE, MILD (H): ICD-10-CM

## 2018-03-15 RX ORDER — FLUOXETINE 40 MG/1
CAPSULE ORAL
Qty: 30 CAPSULE | Refills: 0 | Status: SHIPPED | OUTPATIENT
Start: 2018-03-15 | End: 2018-04-04

## 2018-03-15 NOTE — TELEPHONE ENCOUNTER
FLUoxetine (PROZAC) 40 MG capsule   Medication is being filled for 1 time refill only due to:  Patient needs to be seen because due for 6 month mood follow up.      FLUoxetine (PROZAC) 20 MG capsule  PHQ-9 SCORE 2/2/2016 12/19/2016 8/1/2017   Total Score - - -   Total Score 1 0 5   Medication is being filled for 1 time refill only due to:  Patient needs to be seen because due for 6 month mood follow up.    Please assist with scheduling.    Lashae Correa, RN, BSN

## 2018-03-16 NOTE — TELEPHONE ENCOUNTER
Left message asking pt to return our call.  Please inform her of the message below and assist with scheduling.  Javi Rebolledo, CMA

## 2018-04-04 ENCOUNTER — OFFICE VISIT (OUTPATIENT)
Dept: FAMILY MEDICINE | Facility: OTHER | Age: 58
End: 2018-04-04
Payer: COMMERCIAL

## 2018-04-04 VITALS
HEART RATE: 80 BPM | TEMPERATURE: 97.6 F | RESPIRATION RATE: 20 BRPM | WEIGHT: 213 LBS | DIASTOLIC BLOOD PRESSURE: 80 MMHG | HEIGHT: 65 IN | BODY MASS INDEX: 35.49 KG/M2 | SYSTOLIC BLOOD PRESSURE: 130 MMHG | OXYGEN SATURATION: 100 %

## 2018-04-04 DIAGNOSIS — J30.81 ALLERGIC RHINITIS DUE TO ANIMAL DANDER: ICD-10-CM

## 2018-04-04 DIAGNOSIS — E66.812 CLASS 2 OBESITY WITHOUT SERIOUS COMORBIDITY WITH BODY MASS INDEX (BMI) OF 35.0 TO 35.9 IN ADULT, UNSPECIFIED OBESITY TYPE: ICD-10-CM

## 2018-04-04 DIAGNOSIS — J45.20 MILD INTERMITTENT ASTHMA WITHOUT COMPLICATION: ICD-10-CM

## 2018-04-04 DIAGNOSIS — R11.0 NAUSEA: ICD-10-CM

## 2018-04-04 DIAGNOSIS — Z91.09 HOUSE DUST MITE ALLERGY: ICD-10-CM

## 2018-04-04 DIAGNOSIS — F33.0 MAJOR DEPRESSIVE DISORDER, RECURRENT EPISODE, MILD (H): Primary | ICD-10-CM

## 2018-04-04 DIAGNOSIS — Z91.09 ALLERGY TO MOLD SPORES: ICD-10-CM

## 2018-04-04 DIAGNOSIS — Z12.11 SPECIAL SCREENING FOR MALIGNANT NEOPLASMS, COLON: ICD-10-CM

## 2018-04-04 DIAGNOSIS — I10 HYPERTENSION GOAL BP (BLOOD PRESSURE) < 140/90: ICD-10-CM

## 2018-04-04 DIAGNOSIS — K21.9 GASTROESOPHAGEAL REFLUX DISEASE WITHOUT ESOPHAGITIS: ICD-10-CM

## 2018-04-04 LAB
ANION GAP SERPL CALCULATED.3IONS-SCNC: 7 MMOL/L (ref 3–14)
BUN SERPL-MCNC: 11 MG/DL (ref 7–30)
CALCIUM SERPL-MCNC: 8.4 MG/DL (ref 8.5–10.1)
CHLORIDE SERPL-SCNC: 106 MMOL/L (ref 94–109)
CO2 SERPL-SCNC: 28 MMOL/L (ref 20–32)
CREAT SERPL-MCNC: 0.61 MG/DL (ref 0.52–1.04)
GFR SERPL CREATININE-BSD FRML MDRD: >90 ML/MIN/1.7M2
GLUCOSE SERPL-MCNC: 85 MG/DL (ref 70–99)
POTASSIUM SERPL-SCNC: 4 MMOL/L (ref 3.4–5.3)
SODIUM SERPL-SCNC: 141 MMOL/L (ref 133–144)

## 2018-04-04 PROCEDURE — 36415 COLL VENOUS BLD VENIPUNCTURE: CPT | Performed by: NURSE PRACTITIONER

## 2018-04-04 PROCEDURE — 99214 OFFICE O/P EST MOD 30 MIN: CPT | Performed by: NURSE PRACTITIONER

## 2018-04-04 PROCEDURE — 80048 BASIC METABOLIC PNL TOTAL CA: CPT | Performed by: NURSE PRACTITIONER

## 2018-04-04 RX ORDER — OMEPRAZOLE 40 MG/1
CAPSULE, DELAYED RELEASE ORAL
Qty: 90 CAPSULE | Refills: 1 | Status: SHIPPED | OUTPATIENT
Start: 2018-04-04 | End: 2018-10-17

## 2018-04-04 RX ORDER — MONTELUKAST SODIUM 10 MG/1
10 TABLET ORAL AT BEDTIME
Qty: 90 TABLET | Refills: 1 | Status: SHIPPED | OUTPATIENT
Start: 2018-04-04 | End: 2018-07-05 | Stop reason: SINTOL

## 2018-04-04 RX ORDER — PHENTERMINE HYDROCHLORIDE 15 MG/1
15 CAPSULE ORAL EVERY MORNING
Qty: 30 CAPSULE | Refills: 0 | Status: SHIPPED | OUTPATIENT
Start: 2018-04-04 | End: 2018-05-10

## 2018-04-04 RX ORDER — FLUOXETINE 40 MG/1
CAPSULE ORAL
Qty: 90 CAPSULE | Refills: 1 | Status: SHIPPED | OUTPATIENT
Start: 2018-04-04 | End: 2018-10-17

## 2018-04-04 RX ORDER — ONDANSETRON 4 MG/1
TABLET, ORALLY DISINTEGRATING ORAL
Qty: 60 TABLET | Refills: 0 | Status: SHIPPED | OUTPATIENT
Start: 2018-04-04 | End: 2018-07-05

## 2018-04-04 NOTE — PATIENT INSTRUCTIONS
Start the Singulair once a day     Start the Phentermine once a day.      Come back in 1 month for weight check and blood pressure check.     Labs will be done today.   For normal results, you will receive a letter with the results in about 2 weeks.  If anything is abnormal or unexpected, someone from the clinic will call you.      Send back the FIT test.

## 2018-04-04 NOTE — PROGRESS NOTES
SUBJECTIVE:   Yamel Martinez is a 57 year old female who presents to clinic today for the following health issues:      Hypertension Follow-up      Outpatient blood pressures are not being checked.    Low Salt Diet: low salt    Depression Followup    Status since last visit: Stable     See PHQ-9 for current symptoms.  Other associated symptoms: None    Complicating factors:   Significant life event:  No   Current substance abuse:  None  Anxiety or Panic symptoms:  No    PHQ-9 2/2/2016 12/19/2016 8/1/2017   Total Score 1 0 5   Q9: Suicide Ideation Not at all Not at all Not at all     Asthma Follow-Up    Was ACT completed today?    Yes    ACT Total Scores 8/1/2017   ACT TOTAL SCORE -   ASTHMA ER VISITS -   ASTHMA HOSPITALIZATIONS -   ACT TOTAL SCORE (Goal Greater than or Equal to 20) 21   In the past 12 months, how many times did you visit the emergency room for your asthma without being admitted to the hospital? 0   In the past 12 months, how many times were you hospitalized overnight because of your asthma? 0       Recent asthma triggers that patient is dealing with: smoke, dust mites and mold        Amount of exercise or physical activity: 3 days/week    Problems taking medications regularly: No    Medication side effects: none    Diet: regular (no restrictions)    Also wants to discuss weight loss.  History of gastric bypass.  Has started putting on weight again.  Is working on diet and exercise changes.  She was prescribed Contrave, but could not afford this.     Problem list and histories reviewed & adjusted, as indicated.  Additional history: as documented    Patient Active Problem List   Diagnosis     Mild intermittent asthma without complication     CARDIOVASCULAR SCREENING; LDL GOAL LESS THAN 160     Chronic maxillary sinusitis     Major depressive disorder, recurrent episode, mild (H)     Diagnostic skin and sensitization tests     Allergic rhinitis due to animal dander     House dust mite allergy      Allergy to mold spores     Post-operative pain     Vulvar cancer (H)     Hypertension goal BP (blood pressure) < 140/90     Asymptomatic varicose veins     Gastroesophageal reflux disease without esophagitis     Hip pain, right     Seasonal allergic rhinitis     Osteoarthritis of right hip, unspecified osteoarthritis type     Arthritis of right hip     S/P total hip arthroplasty     Primary osteoarthritis of left shoulder     Past Surgical History:   Procedure Laterality Date     ARTHROPLASTY HIP Right 3/28/2016    Procedure: ARTHROPLASTY HIP;  Surgeon: Juan Espinoza MD;  Location: [facility]     C LIGATE FALLOPIAN TUBE       C NONSPECIFIC PROCEDURE      Sinus surgery cyst and septum     C NONSPECIFIC PROCEDURE      Vein closure     HC COLP CERVIX/UPPER VAGINA W LOOP ELEC BX CERVIX  1999     INCISION AND DRAINAGE HIP, COMBINED  2010    Post operative     JOINT REPLACEMENT, HIP RT/LT  2010    Joint Replacement Hip LT     JOINT REPLACEMENT, HIP RT/LT  01/07/13    left total hip arthroplasty     VULVECTOMY RADICAL (NO GROIN DISSECTION)  4/18/2013    Procedure: VULVECTOMY RADICAL (NO GROIN DISSECTION);  Left radical Vulvectomy ;  Surgeon: Gloria Broussard MD;  Location:  OR       Social History   Substance Use Topics     Smoking status: Former Smoker     Packs/day: 0.50     Quit date: 1/1/2013     Smokeless tobacco: Never Used      Comment: Quit 3 yrs ago, however smokes up to 5 cigarettes weekly     Alcohol use 0.0 oz/week     0 Standard drinks or equivalent per week      Comment: 2-3  weekly     Family History   Problem Relation Age of Onset     Anesthesia Reaction No family hx of      Blood Disease No family hx of      C.A.D. No family hx of      DIABETES Paternal Grandmother      DIABETES Father      Hypertension Mother      Breast Cancer Maternal Grandmother      Cancer - colorectal No family hx of      Thyroid Disease Father      cancer of thyroid         Current Outpatient Prescriptions   Medication Sig  Dispense Refill     FLUoxetine (PROZAC) 40 MG capsule TAKE ONE CAPSULE BY MOUTH EVERY DAY WITH 20MG CAPSULE 90 capsule 1     FLUoxetine (PROZAC) 20 MG capsule Take 1 capsule (20 mg) by mouth daily 90 capsule 1     omeprazole (PRILOSEC) 40 MG capsule TAKE ONE CAPSULE BY MOUTH EVERY DAY 30-60 MINUTES BEFORE A MEAL --PATIENT REQUESTS SANDOZ BRAND - Fill when requested 90 capsule 1     ondansetron (ZOFRAN-ODT) 4 MG ODT tab DISSOLVE TWO TABLETS ON TONGUE EVERY 8 HOURS AS NEEDED FOR NAUSEA 60 tablet 0     montelukast (SINGULAIR) 10 MG tablet Take 1 tablet (10 mg) by mouth At Bedtime 90 tablet 1     phentermine 15 MG capsule Take 1 capsule (15 mg) by mouth every morning 30 capsule 0     fluticasone (FLONASE) 50 MCG/ACT spray Spray 1-2 sprays into both nostrils daily 1 Bottle 11     ALBUTEROL 108 (90 BASE) MCG/ACT inhaler INHALE ONE TO TWO PUFFS BY MOUTH FOUR TIMES A DAY AS NEEDED-DUE FOR ASTHMA FOLLOW UP 8.5 g 1     CALCIUM-MAGNESIUM-ZINC PO 1 tablet daily       Multiple Vitamins-Minerals (CENTRUM SILVER PO) Take  by mouth.       ZYRTEC 10 MG OR TABS ONE TABLET DAILY 1 month 2     [DISCONTINUED] FLUoxetine (PROZAC) 40 MG capsule TAKE ONE CAPSULE BY MOUTH EVERY DAY WITH 20MG CAPSULE 30 capsule 0     [DISCONTINUED] FLUoxetine (PROZAC) 20 MG capsule TAKE ONE CAPSULE BY MOUTH EVERY DAY 30 capsule 0     Allergies   Allergen Reactions     Latex Itching     Lisinopril      Cough     Red Dye      BP Readings from Last 3 Encounters:   04/04/18 130/80   09/28/17 124/76   08/01/17 124/82    Wt Readings from Last 3 Encounters:   04/04/18 213 lb (96.6 kg)   09/28/17 206 lb (93.4 kg)   08/01/17 201 lb (91.2 kg)                    Reviewed and updated as needed this visit by clinical staff  Tobacco  Allergies  Meds  Soc Hx      Reviewed and updated as needed this visit by Provider         ROS:  Constitutional, HEENT, cardiovascular, pulmonary, gi and gu systems are negative, except as otherwise noted.    OBJECTIVE:     /80   "Pulse 80  Temp 97.6  F (36.4  C) (Tympanic)  Resp 20  Ht 5' 5.2\" (1.656 m)  Wt 213 lb (96.6 kg)  LMP 07/20/2010 (Approximate)  SpO2 100%  Breastfeeding? No  BMI 35.23 kg/m2  Body mass index is 35.23 kg/(m^2).  GENERAL: alert, no distress and obese  NECK: no adenopathy, no asymmetry, masses, or scars and thyroid normal to palpation  RESP: lungs clear to auscultation - no rales, rhonchi or wheezes  CV: regular rate and rhythm, normal S1 S2, no S3 or S4, no murmur, click or rub, no peripheral edema and peripheral pulses strong  MS: no gross musculoskeletal defects noted, no edema    Diagnostic Test Results:  Pending     ASSESSMENT/PLAN:       1. Major depressive disorder, recurrent episode, mild (H)  Chronic, controlled.  No change in treatment plan.   - FLUoxetine (PROZAC) 40 MG capsule; TAKE ONE CAPSULE BY MOUTH EVERY DAY WITH 20MG CAPSULE  Dispense: 90 capsule; Refill: 1  - FLUoxetine (PROZAC) 20 MG capsule; Take 1 capsule (20 mg) by mouth daily  Dispense: 90 capsule; Refill: 1  - Basic metabolic panel  (Ca, Cl, CO2, Creat, Gluc, K, Na, BUN)    2. Gastroesophageal reflux disease without esophagitis  Chronic, controlled.  No change in treatment plan.   - omeprazole (PRILOSEC) 40 MG capsule; TAKE ONE CAPSULE BY MOUTH EVERY DAY 30-60 MINUTES BEFORE A MEAL --PATIENT REQUESTS SANDOZ BRAND - Fill when requested  Dispense: 90 capsule; Refill: 1  - Basic metabolic panel  (Ca, Cl, CO2, Creat, Gluc, K, Na, BUN)    3. Mild intermittent asthma without complication  Not under good control.  Allergy driven, will add on Singulair daily.  Return in 1-2 months for recheck.     4. Nausea  - ondansetron (ZOFRAN-ODT) 4 MG ODT tab; DISSOLVE TWO TABLETS ON TONGUE EVERY 8 HOURS AS NEEDED FOR NAUSEA  Dispense: 60 tablet; Refill: 0    5. House dust mite allergy  - montelukast (SINGULAIR) 10 MG tablet; Take 1 tablet (10 mg) by mouth At Bedtime  Dispense: 90 tablet; Refill: 1    6. Allergic rhinitis due to animal dander  - montelukast " (SINGULAIR) 10 MG tablet; Take 1 tablet (10 mg) by mouth At Bedtime  Dispense: 90 tablet; Refill: 1    7. Allergy to mold spores  - montelukast (SINGULAIR) 10 MG tablet; Take 1 tablet (10 mg) by mouth At Bedtime  Dispense: 90 tablet; Refill: 1    8. Special screening for malignant neoplasms, colon  - Fecal colorectal cancer screen (FIT); Future    9. Hypertension goal BP (blood pressure) < 140/90  Chronic, controlled.  No change in treatment plan.   - Basic metabolic panel  (Ca, Cl, CO2, Creat, Gluc, K, Na, BUN)    10. Class 2 obesity without serious comorbidity with body mass index (BMI) of 35.0 to 35.9 in adult, unspecified obesity type  Start Phentermine.  Return in 1 month for weight check and blood pressure check.  Discussed short term use of this with a diet and exercise plan for weight loss.   - phentermine 15 MG capsule; Take 1 capsule (15 mg) by mouth every morning  Dispense: 30 capsule; Refill: 0    FUTURE APPOINTMENTS:       - Follow-up visit in 1 month for recheck of blood pressure, asthma, weight.   See Patient Instructions    CHARLENE Cardoso [facility]

## 2018-04-04 NOTE — LETTER
My Asthma Action Plan  Name: Yamel Martinez   YOB: 1960  Date: 4/4/2018   My doctor: CHARLENE Cardoso CNP   My clinic: [facility]        My Control Medicine: None  My Rescue Medicine: Albuterol (Proair/Ventolin/Proventil) inhaler .   My Asthma Severity: intermittent  Avoid your asthma triggers: smoke, dust mites and mold  smoke  dust mites  mold            GREEN ZONE   Good Control    I feel good    No cough or wheeze    Can work, sleep and play without asthma symptoms       Take your asthma control medicine every day.     1. If exercise triggers your asthma, take your rescue medication    15 minutes before exercise or sports, and    During exercise if you have asthma symptoms  2. Spacer to use with inhaler: If you have a spacer, make sure to use it with your inhaler             YELLOW ZONE Getting Worse  I have ANY of these:    I do not feel good    Cough or wheeze    Chest feels tight    Wake up at night   1. Keep taking your Green Zone medications  2. Start taking your rescue medicine:    every 20 minutes for up to 1 hour. Then every 4 hours for 24-48 hours.  3. If you stay in the Yellow Zone for more than 12-24 hours, contact your doctor.  4. If you do not return to the Green Zone in 12-24 hours or you get worse, start taking your oral steroid medicine if prescribed by your provider.           RED ZONE Medical Alert - Get Help  I have ANY of these:    I feel awful    Medicine is not helping    Breathing getting harder    Trouble walking or talking    Nose opens wide to breathe       1. Take your rescue medicine NOW  2. If your provider has prescribed an oral steroid medicine, start taking it NOW  3. Call your doctor NOW  4. If you are still in the Red Zone after 20 minutes and you have not reached your doctor:    Take your rescue medicine again and    Call 911 or go to the emergency room right away    See your regular doctor within 2 weeks of an Emergency Room or Urgent Care  visit for follow-up treatment.          Annual Reminders:  Meet with Asthma Educator,  Flu Shot in the Fall, consider Pneumonia Vaccination for patients with asthma (aged 19 and older).    Pharmacy:    [facility] [place], [place] - [address] [facility] [place], [place] - [address] [facility]                      Asthma Triggers  How To Control Things That Make Your Asthma Worse    Triggers are things that make your asthma worse.  Look at the list below to help you find your triggers and what you can do about them.  You can help prevent asthma flare-ups by staying away from your triggers.      Trigger                                                          What you can do   Cigarette Smoke  Tobacco smoke can make asthma worse. Do not allow smoking in your home, car or around you.  Be sure no one smokes at a child s day care or school.  If you smoke, ask your health care provider for ways to help you quit.  Ask family members to quit too.  Ask your health care provider for a referral to Quit Plan to help you quit smoking, or call 5-061-894-PLAN.     Colds, Flu, Bronchitis  These are common triggers of asthma. Wash your hands often.  Don t touch your eyes, nose or mouth.  Get a flu shot every year.     Dust Mites  These are tiny bugs that live in cloth or carpet. They are too small to see. Wash sheets and blankets in hot water every week.   Encase pillows and mattress in dust mite proof covers.  Avoid having carpet if you can. If you have carpet, vacuum weekly.   Use a dust mask and HEPA vacuum.   Pollen and Outdoor Mold  Some people are allergic to trees, grass, or weed pollen, or molds. Try to keep your windows closed.  Limit time out doors when pollen count is high.   Ask you health care provider about taking medicine during allergy season.     Animal Dander  Some people are allergic to skin flakes, urine or saliva from pets with fur or feathers. Keep pets with fur or feathers out of your home.     If you can t keep the pet outdoors, then keep the pet out of your bedroom.  Keep the bedroom door closed.  Keep pets off cloth furniture and away from stuffed toys.     Mice, Rats, and Cockroaches  Some people are allergic to the waste from these pests.   Cover food and garbage.  Clean up spills and food crumbs.  Store grease in the refrigerator.   Keep food out of the bedroom.   Indoor Mold  This can be a trigger if your home has high moisture. Fix leaking faucets, pipes, or other sources of water.   Clean moldy surfaces.  Dehumidify basement if it is damp and smelly.   Smoke, Strong Odors, and Sprays  These can reduce air quality. Stay away from strong odors and sprays, such as perfume, powder, hair spray, paints, smoke incense, paint, cleaning products, candles and new carpet.   Exercise or Sports  Some people with asthma have this trigger. Be active!  Ask your doctor about taking medicine before sports or exercise to prevent symptoms.    Warm up for 5-10 minutes before and after sports or exercise.     Other Triggers of Asthma  Cold air:  Cover your nose and mouth with a scarf.  Sometimes laughing or crying can be a trigger.  Some medicines and food can trigger asthma.

## 2018-04-04 NOTE — NURSING NOTE
"Chief Complaint   Patient presents with     Asthma     recheck     Hypertension     recheck     Depression     recheck     Health Maintenance     FIT, bmp, dap, phq-9, aap, act       Initial /80  Pulse 80  Temp 97.6  F (36.4  C) (Tympanic)  Resp 20  Ht 5' 5.2\" (1.656 m)  Wt 213 lb (96.6 kg)  LMP 07/20/2010 (Approximate)  SpO2 100%  Breastfeeding? No  BMI 35.23 kg/m2 Estimated body mass index is 35.23 kg/(m^2) as calculated from the following:    Height as of this encounter: 5' 5.2\" (1.656 m).    Weight as of this encounter: 213 lb (96.6 kg).  Medication Reconciliation: complete   ................Eric Ramos LPN,   April 4, 2018,      8:36 AM,   [facility]     "

## 2018-04-04 NOTE — MR AVS SNAPSHOT
After Visit Summary   4/4/2018    Yamel Martinez    MRN: [id number]           Patient Information     Date Of Birth          1960        Visit Information        Provider Department      4/4/2018 8:20 AM Sheryl Sparks APRN [facility]        Today's Diagnoses     Mild intermittent asthma without complication    -  1    Major depressive disorder, recurrent episode, mild (H)        Gastroesophageal reflux disease without esophagitis        Nausea        House dust mite allergy        Allergic rhinitis due to animal dander        Allergy to mold spores        Mild major depression (H)        Mild intermittent asthma, uncomplicated        Special screening for malignant neoplasms, colon        Hypertension goal BP (blood pressure) < 140/90        Class 2 obesity without serious comorbidity with body mass index (BMI) of 35.0 to 35.9 in adult, unspecified obesity type          Care Instructions    Start the Singulair once a day     Start the Phentermine once a day.      Come back in 1 month for weight check and blood pressure check.     Labs will be done today.   For normal results, you will receive a letter with the results in about 2 weeks.  If anything is abnormal or unexpected, someone from the clinic will call you.      Send back the FIT test.               Follow-ups after your visit        Your next 10 appointments already scheduled     Apr 10, 2018 11:15 AM CDT   (Arrive by 11:00 AM)   MA SCREENING DIGITAL BILATERAL with [facility]   [facility]    [address] [facility] [address] [phone]           Do not use any powder, lotion or deodorant under your arms or on your breast. If you do, we will ask you to remove it before your exam.  Wear comfortable, two-piece clothing.  If you have any allergies, tell your care team.  Bring any previous mammograms from other facilities or have them mailed to the breast center.             "  Future tests that were ordered for you today     Open Future Orders        Priority Expected Expires Ordered    Fecal colorectal cancer screen (FIT) Routine 2018            Who to contact     If you have questions or need follow up information about today's clinic visit or your schedule please contact [facility] directly at [phone].  Normal or non-critical lab and imaging results will be communicated to you by MyChart, letter or phone within 4 business days after the clinic has received the results. If you do not hear from us within 7 days, please contact the clinic through [email or web address] or phone. If you have a critical or abnormal lab result, we will notify you by phone as soon as possible.  Submit refill requests through BeDo or call your pharmacy and they will forward the refill request to us. Please allow 3 business days for your refill to be completed.          Additional Information About Your Visit        BeDo Information     BeDo lets you send messages to your doctor, view your test results, renew your prescriptions, schedule appointments and more. To sign up, go to [email or web address] . Click on \"Log in\" on the left side of the screen, which will take you to the Welcome page. Then click on \"Sign up Now\" on the right side of the page.     You will be asked to enter the access code listed below, as well as some personal information. Please follow the directions to create your username and password.     Your access code is: [email or web address]  Expires: 7/3/2018  9:01 AM     Your access code will  in 90 days. If you need help or a new code, please call your [facility] or [phone].        Care EveryWhere ID     This is your Care EveryWhere ID. This could be used by other organizations to access your [facility] medical records  [id number]        Your Vitals Were     Pulse Temperature Respirations Height Last Period Pulse Oximetry    80 97.6  F (36.4  C) " "(Tympanic) 20 5' 5.2\" (1.656 m) 07/20/2010 (Approximate) 100%    Breastfeeding? BMI (Body Mass Index)                No 35.23 kg/m2           Blood Pressure from Last 3 Encounters:   04/04/18 130/80   09/28/17 124/76   08/01/17 124/82    Weight from Last 3 Encounters:   04/04/18 213 lb (96.6 kg)   09/28/17 206 lb (93.4 kg)   08/01/17 201 lb (91.2 kg)              We Performed the Following     Basic metabolic panel  (Ca, Cl, CO2, Creat, Gluc, K, Na, BUN)          Today's Medication Changes          These changes are accurate as of 4/4/18  9:01 AM.  If you have any questions, ask your nurse or doctor.               Start taking these medicines.        Dose/Directions    montelukast 10 MG tablet   Commonly known as:  SINGULAIR   Used for:  House dust mite allergy, Allergic rhinitis due to animal dander, Allergy to mold spores, Mild major depression (H), Mild intermittent asthma, uncomplicated   Started by:  Sheryl Sparks APRN CNP        Dose:  10 mg   Take 1 tablet (10 mg) by mouth At Bedtime   Quantity:  90 tablet   Refills:  1       phentermine 15 MG capsule   Used for:  Class 2 obesity without serious comorbidity with body mass index (BMI) of 35.0 to 35.9 in adult, unspecified obesity type   Started by:  Sheryl Sparks APRN CNP        Dose:  15 mg   Take 1 capsule (15 mg) by mouth every morning   Quantity:  30 capsule   Refills:  0         These medicines have changed or have updated prescriptions.        Dose/Directions    * FLUoxetine 40 MG capsule   Commonly known as:  PROzac   This may have changed:  See the new instructions.   Used for:  Major depressive disorder, recurrent episode, mild (H)   Changed by:  Sheryl Sparks APRN CNP        TAKE ONE CAPSULE BY MOUTH EVERY DAY WITH 20MG CAPSULE   Quantity:  90 capsule   Refills:  1       * FLUoxetine 20 MG capsule   Commonly known as:  PROzac   This may have changed:  See the new instructions.   Used for:  Major depressive disorder, recurrent episode, " mild (H)   Changed by:  Sheryl Sparks APRN CNP        Dose:  20 mg   Take 1 capsule (20 mg) by mouth daily   Quantity:  90 capsule   Refills:  1       omeprazole 40 MG capsule   Commonly known as:  priLOSEC   This may have changed:  See the new instructions.   Used for:  Gastroesophageal reflux disease without esophagitis   Changed by:  Sheryl Sparks APRN CNP        TAKE ONE CAPSULE BY MOUTH EVERY DAY 30-60 MINUTES BEFORE A MEAL --PATIENT REQUESTS SANDOZ BRAND - Fill when requested   Quantity:  90 capsule   Refills:  1       * Notice:  This list has 2 medication(s) that are the same as other medications prescribed for you. Read the directions carefully, and ask your doctor or other care provider to review them with you.      Stop taking these medicines if you haven't already. Please contact your care team if you have questions.     naltrexone-bupropion 8-90 MG per 12 hr tablet   Commonly known as:  CONTRAVE   Stopped by:  Sheryl Sparks APRN CNP                Where to get your medicines      These medications were sent to [place] [address]   [address] [place] [address]     Phone:  [phone]     FLUoxetine 20 MG capsule    FLUoxetine 40 MG capsule    montelukast 10 MG tablet    omeprazole 40 MG capsule    ondansetron 4 MG ODT tab         Some of these will need a paper prescription and others can be bought over the counter.  Ask your nurse if you have questions.     Bring a paper prescription for each of these medications     phentermine 15 MG capsule                Primary Care Provider Office Phone # Fax #    CHARLENE Cardoso [phone]       [address] [facility] [address]        Equal Access to Services     [facility]: Hadii carlos Perez, waaxda luqadaha, qaybta kaalmakoko rene, chase hawkins. So [facility] [phone].    ATENCIÓN: Si habla español, tiene a solis disposición servicios gratuitos  de asistencia lingüística. Ziyad matamoros [phone].    We comply with applicable federal civil rights laws and [place] laws. We do not discriminate on the basis of race, color, national origin, age, disability, sex, sexual orientation, or gender identity.            Thank you!     Thank you for choosing [facility]  for your care. Our goal is always to provide you with excellent care. Hearing back from our patients is one way we can continue to improve our services. Please take a few minutes to complete the written survey that you may receive in the mail after your visit with us. Thank you!             Your Updated Medication List - Protect others around you: Learn how to safely use, store and throw away your medicines at www.disposemymeds.org.          This list is accurate as of 4/4/18  9:01 AM.  Always use your most recent med list.                   Brand Name Dispense Instructions for use Diagnosis    CALCIUM-MAGNESIUM-ZINC PO      1 tablet daily    Mild intermittent asthma       CENTRUM SILVER PO      Take  by mouth.        * FLUoxetine 40 MG capsule    PROzac    90 capsule    TAKE ONE CAPSULE BY MOUTH EVERY DAY WITH 20MG CAPSULE    Major depressive disorder, recurrent episode, mild (H)       * FLUoxetine 20 MG capsule    PROzac    90 capsule    Take 1 capsule (20 mg) by mouth daily    Major depressive disorder, recurrent episode, mild (H)       fluticasone 50 MCG/ACT spray    FLONASE    1 Bottle    Spray 1-2 sprays into both nostrils daily    Acute recurrent maxillary sinusitis       montelukast 10 MG tablet    SINGULAIR    90 tablet    Take 1 tablet (10 mg) by mouth At Bedtime    House dust mite allergy, Allergic rhinitis due to animal dander, Allergy to mold spores, Mild major depression (H), Mild intermittent asthma, uncomplicated       omeprazole 40 MG capsule    priLOSEC    90 capsule    TAKE ONE CAPSULE BY MOUTH EVERY DAY 30-60 MINUTES BEFORE A MEAL --PATIENT REQUESTS SANDOZ BRAND - Fill  when requested    Gastroesophageal reflux disease without esophagitis       ondansetron 4 MG ODT tab    ZOFRAN-ODT    60 tablet    DISSOLVE TWO TABLETS ON TONGUE EVERY 8 HOURS AS NEEDED FOR NAUSEA    Nausea       phentermine 15 MG capsule     30 capsule    Take 1 capsule (15 mg) by mouth every morning    Class 2 obesity without serious comorbidity with body mass index (BMI) of 35.0 to 35.9 in adult, unspecified obesity type       PROAIR  (90 BASE) MCG/ACT Inhaler   Generic drug:  albuterol     8.5 g    INHALE ONE TO TWO PUFFS BY MOUTH FOUR TIMES A DAY AS NEEDED-DUE FOR ASTHMA FOLLOW UP    Mild intermittent asthma without complication       ZYRTEC 10 MG tablet   Generic drug:  cetirizine     1 month    ONE TABLET DAILY    Allergic rhinitis, cause unspecified       * Notice:  This list has 2 medication(s) that are the same as other medications prescribed for you. Read the directions carefully, and ask your doctor or other care provider to review them with you.

## 2018-04-04 NOTE — LETTER
My Depression Action Plan  Name: Yamel Martinez   Date of Birth 1960  Date: 4/4/2018    My doctor: Sheryl Sparks   My clinic: [place] [address] [facility] [address] [phone]          GREEN    ZONE   Good Control    What it looks like:     Things are going generally well. You have normal up s and down s. You may even feel depressed from time to time, but bad moods usually last less than a day.   What you need to do:  1. Continue to care for yourself (see self care plan)  2. Check your depression survival kit and update it as needed  3. Follow your physician s recommendations including any medication.  4. Do not stop taking medication unless you consult with your physician first.           YELLOW         ZONE Getting Worse    What it looks like:     Depression is starting to interfere with your life.     It may be hard to get out of bed; you may be starting to isolate yourself from others.    Symptoms of depression are starting to last most all day and this has happened for several days.     You may have suicidal thoughts but they are not constant.   What you need to do:     1. Call your care team, your response to treatment will improve if you keep your care team informed of your progress. Yellow periods are signs an adjustment may need to be made.     2. Continue your self-care, even if you have to fake it!    3. Talk to someone in your support network    4. Open up your depression survival kit           RED    ZONE Medical Alert - Get Help    What it looks like:     Depression is seriously interfering with your life.     You may experience these or other symptoms: You can t get out of bed most days, can t work or engage in other necessary activities, you have trouble taking care of basic hygiene, or basic responsibilities, thoughts of suicide or death that will not go away, self-injurious behavior.     What you need to do:  1. Call your care team and request a  same-day appointment. If they are not available (weekends or after hours) call your local crisis line, emergency room or 911.            Depression Self Care Plan / Survival Kit    Self-Care for Depression  Here s the deal. Your body and mind are really not as separate as most people think.  What you do and think affects how you feel and how you feel influences what you do and think. This means if you do things that people who feel good do, it will help you feel better.  Sometimes this is all it takes.  There is also a place for medication and therapy depending on how severe your depression is, so be sure to consult with your medical provider and/ or Behavioral Health Consultant if your symptoms are worsening or not improving.     In order to better manage my stress, I will:    Exercise  Get some form of exercise, every day. This will help reduce pain and release endorphins, the  feel good  chemicals in your brain. This is almost as good as taking antidepressants!  This is not the same as joining a gym and then never going! (they count on that by the way ) It can be as simple as just going for a walk or doing some gardening, anything that will get you moving.      Hygiene   Maintain good hygiene (Get out of bed in the morning, Make your bed, Brush your teeth, Take a shower, and Get dressed like you were going to work, even if you are unemployed).  If your clothes don't fit try to get ones that do.    Diet  I will strive to eat foods that are good for me, drink plenty of water, and avoid excessive sugar, caffeine, alcohol, and other mood-altering substances.  Some foods that are helpful in depression are: complex carbohydrates, B vitamins, flaxseed, fish or fish oil, fresh fruits and vegetables.    Psychotherapy  I agree to participate in Individual Therapy (if recommended).    Medication  If prescribed medications, I agree to take them.  Missing doses can result in serious side effects.  I understand that drinking  alcohol, or other illicit drug use, may cause potential side effects.  I will not stop my medication abruptly without first discussing it with my provider.    Staying Connected With Others  I will stay in touch with my friends, family members, and my primary care provider/team.    Use your imagination  Be creative.  We all have a creative side; it doesn t matter if it s oil painting, sand castles, or mud pies! This will also kick up the endorphins.    Witness Beauty  (AKA stop and smell the roses) Take a look outside, even in mid-winter. Notice colors, textures. Watch the squirrels and birds.     Service to others  Be of service to others.  There is always someone else in need.  By helping others we can  get out of ourselves  and remember the really important things.  This also provides opportunities for practicing all the other parts of the program.    Humor  Laugh and be silly!  Adjust your TV habits for less news and crime-drama and more comedy.    Control your stress  Try breathing deep, massage therapy, biofeedback, and meditation. Find time to relax each day.     My support system    Clinic Contact:  Phone number:    Contact 1:  Phone number:    Contact 2:  Phone number:    [employer]/:  Phone number:    Therapist:  Phone number:    Local crisis center:    Phone number:    Other community support:  Phone number:

## 2018-04-05 ASSESSMENT — PATIENT HEALTH QUESTIONNAIRE - PHQ9: SUM OF ALL RESPONSES TO PHQ QUESTIONS 1-9: 5

## 2018-04-05 ASSESSMENT — ASTHMA QUESTIONNAIRES: ACT_TOTALSCORE: 18

## 2018-04-10 ENCOUNTER — RADIANT APPOINTMENT (OUTPATIENT)
Dept: MAMMOGRAPHY | Facility: OTHER | Age: 58
End: 2018-04-10
Attending: NURSE PRACTITIONER
Payer: COMMERCIAL

## 2018-04-10 DIAGNOSIS — Z12.31 VISIT FOR SCREENING MAMMOGRAM: ICD-10-CM

## 2018-04-10 PROCEDURE — 77067 SCR MAMMO BI INCL CAD: CPT | Mod: TC

## 2018-04-12 PROCEDURE — 82274 ASSAY TEST FOR BLOOD FECAL: CPT | Performed by: NURSE PRACTITIONER

## 2018-04-15 LAB — HEMOCCULT STL QL IA: NEGATIVE

## 2018-04-16 DIAGNOSIS — Z12.11 SPECIAL SCREENING FOR MALIGNANT NEOPLASMS, COLON: ICD-10-CM

## 2018-04-16 NOTE — PROGRESS NOTES
Letter sent to patient informing of NL fit test.  ................Eric Ramos LPN,   April 16, 2018,      7:42 AM,   [facility]

## 2018-04-16 NOTE — LETTER
April 16, 2018      Yamel Martinez  [id number] [address] [place] [id number]        Dear ,    We are writing to inform you of your test results.    Your test results fall within the expected range(s)n.    Resulted Orders   Fecal colorectal cancer screen (FIT)   Result Value Ref Range    Occult Blood Scn FIT Negative NEG^Negative       If you have any questions or concerns, please call the clinic at the number listed above.       Sincerely,        [facility]

## 2018-05-10 DIAGNOSIS — E66.812 CLASS 2 OBESITY WITHOUT SERIOUS COMORBIDITY WITH BODY MASS INDEX (BMI) OF 35.0 TO 35.9 IN ADULT, UNSPECIFIED OBESITY TYPE: ICD-10-CM

## 2018-05-10 RX ORDER — PHENTERMINE HYDROCHLORIDE 15 MG/1
15 CAPSULE ORAL EVERY MORNING
Qty: 30 CAPSULE | Refills: 0 | Status: SHIPPED | OUTPATIENT
Start: 2018-05-10 | End: 2018-07-05

## 2018-05-10 NOTE — TELEPHONE ENCOUNTER
Phentermine       Last Written Prescription Date:  4/4/18  Last Fill Quantity: 30,   # refills: 0  Last Office Visit: 4/4/18  Future Office visit:       Routing refill request to provider for review/approval because:  Drug not on the G, P or [facility] refill protocol or controlled substance

## 2018-05-10 NOTE — TELEPHONE ENCOUNTER
Hard copy Rx faxed to [facility] pharmacy.  ................Eric Ramos LPN,   May 10, 2018,      3:15 PM,   [facility]

## 2018-07-05 ENCOUNTER — TELEPHONE (OUTPATIENT)
Dept: FAMILY MEDICINE | Facility: OTHER | Age: 58
End: 2018-07-05

## 2018-07-05 ENCOUNTER — OFFICE VISIT (OUTPATIENT)
Dept: FAMILY MEDICINE | Facility: OTHER | Age: 58
End: 2018-07-05
Payer: COMMERCIAL

## 2018-07-05 VITALS
BODY MASS INDEX: 34.9 KG/M2 | OXYGEN SATURATION: 100 % | SYSTOLIC BLOOD PRESSURE: 112 MMHG | RESPIRATION RATE: 20 BRPM | HEART RATE: 78 BPM | TEMPERATURE: 97.5 F | DIASTOLIC BLOOD PRESSURE: 70 MMHG | WEIGHT: 211 LBS

## 2018-07-05 DIAGNOSIS — Z91.09 HOUSE DUST MITE ALLERGY: ICD-10-CM

## 2018-07-05 DIAGNOSIS — J01.01 ACUTE RECURRENT MAXILLARY SINUSITIS: Primary | ICD-10-CM

## 2018-07-05 DIAGNOSIS — L98.7 EXCESS SKIN OF ARM: ICD-10-CM

## 2018-07-05 DIAGNOSIS — J45.20 MILD INTERMITTENT ASTHMA WITHOUT COMPLICATION: ICD-10-CM

## 2018-07-05 DIAGNOSIS — Z11.3 SCREEN FOR STD (SEXUALLY TRANSMITTED DISEASE): ICD-10-CM

## 2018-07-05 DIAGNOSIS — R11.0 NAUSEA: ICD-10-CM

## 2018-07-05 DIAGNOSIS — Z11.59 NEED FOR HEPATITIS C SCREENING TEST: ICD-10-CM

## 2018-07-05 DIAGNOSIS — J30.81 ALLERGIC RHINITIS DUE TO ANIMAL DANDER: ICD-10-CM

## 2018-07-05 DIAGNOSIS — Z91.09 ALLERGY TO MOLD SPORES: ICD-10-CM

## 2018-07-05 PROCEDURE — 36415 COLL VENOUS BLD VENIPUNCTURE: CPT | Performed by: NURSE PRACTITIONER

## 2018-07-05 PROCEDURE — 87389 HIV-1 AG W/HIV-1&-2 AB AG IA: CPT | Performed by: NURSE PRACTITIONER

## 2018-07-05 PROCEDURE — 86803 HEPATITIS C AB TEST: CPT | Performed by: NURSE PRACTITIONER

## 2018-07-05 PROCEDURE — 99214 OFFICE O/P EST MOD 30 MIN: CPT | Performed by: NURSE PRACTITIONER

## 2018-07-05 RX ORDER — ONDANSETRON 4 MG/1
TABLET, ORALLY DISINTEGRATING ORAL
Qty: 60 TABLET | Refills: 0 | Status: SHIPPED | OUTPATIENT
Start: 2018-07-05

## 2018-07-05 RX ORDER — MONTELUKAST SODIUM 10 MG/1
10 TABLET ORAL AT BEDTIME
Qty: 90 TABLET | Refills: 1 | Status: SHIPPED | OUTPATIENT
Start: 2018-07-05 | End: 2018-10-17

## 2018-07-05 RX ORDER — FLUTICASONE PROPIONATE 50 MCG
1-2 SPRAY, SUSPENSION (ML) NASAL DAILY
Qty: 1 BOTTLE | Refills: 11 | Status: SHIPPED | OUTPATIENT
Start: 2018-07-05 | End: 2018-10-17

## 2018-07-05 RX ORDER — ALBUTEROL SULFATE 90 UG/1
AEROSOL, METERED RESPIRATORY (INHALATION)
Qty: 8.5 G | Refills: 1 | Status: SHIPPED | OUTPATIENT
Start: 2018-07-05

## 2018-07-05 NOTE — PROGRESS NOTES
SUBJECTIVE:   Yamel Martinez is a 57 year old female who presents to clinic today for the following health issues:      Asthma Follow-Up    Was ACT completed today?    Yes    ACT Total Scores 7/5/2018   ACT TOTAL SCORE -   ASTHMA ER VISITS -   ASTHMA HOSPITALIZATIONS -   ACT TOTAL SCORE (Goal Greater than or Equal to 20) 16   In the past 12 months, how many times did you visit the emergency room for your asthma without being admitted to the hospital? 0   In the past 12 months, how many times were you hospitalized overnight because of your asthma? 0       Recent asthma triggers that patient is dealing with: smoke, upper respiratory infections, pollens, humidity, strong odors and fumes, exercise or sports, Gastric Reflux and lying down    She was started on Singulair a few months ago. Thinks it may be helping.  Most of her symptoms are sinus related - congestion, sinus pressure and post-nasal drip.  This has been worsening over the last few weeks.  Had chills yesterday.  History of sinus surgery and is on Flonase daily.   She has a diagnosis of asthma, is only on PRN Albuterol for this.  States she was previously on a controller inhaler, but had side effects from this.  She has not had recent PFTs done.       She also wants to see a surgeon about getting extra skin removed from her upper arms.  Had gastric bypass surgery and lost 100+ pounds.  Has extra skin on her arms that causes pain.      Problem list and histories reviewed & adjusted, as indicated.  Additional history: as documented    Patient Active Problem List   Diagnosis     Mild intermittent asthma without complication     CARDIOVASCULAR SCREENING; LDL GOAL LESS THAN 160     Chronic maxillary sinusitis     Major depressive disorder, recurrent episode, mild (H)     Diagnostic skin and sensitization tests     Allergic rhinitis due to animal dander     House dust mite allergy     Allergy to mold spores     Post-operative pain     Vulvar cancer (H)      Hypertension goal BP (blood pressure) < 140/90     Asymptomatic varicose veins     Gastroesophageal reflux disease without esophagitis     Hip pain, right     Seasonal allergic rhinitis     Osteoarthritis of right hip, unspecified osteoarthritis type     Arthritis of right hip     S/P total hip arthroplasty     Primary osteoarthritis of left shoulder     Past Surgical History:   Procedure Laterality Date     ARTHROPLASTY HIP Right 3/28/2016    Procedure: ARTHROPLASTY HIP;  Surgeon: Juan Espinoza MD;  Location: [facility]     C LIGATE FALLOPIAN TUBE       C NONSPECIFIC PROCEDURE      Sinus surgery cyst and septum     C NONSPECIFIC PROCEDURE      Vein closure     HC COLP CERVIX/UPPER VAGINA W LOOP ELEC BX CERVIX  1999     INCISION AND DRAINAGE HIP, COMBINED  2010    Post operative     JOINT REPLACEMENT, HIP RT/LT  2010    Joint Replacement Hip LT     JOINT REPLACEMENT, HIP RT/LT  01/07/13    left total hip arthroplasty     VULVECTOMY RADICAL (NO GROIN DISSECTION)  4/18/2013    Procedure: VULVECTOMY RADICAL (NO GROIN DISSECTION);  Left radical Vulvectomy ;  Surgeon: Gloria Broussard MD;  Location:  OR       Social History   Substance Use Topics     Smoking status: Former Smoker     Packs/day: 0.50     Quit date: 1/1/2013     Smokeless tobacco: Never Used      Comment: Quit 3 yrs ago, however smokes up to 5 cigarettes weekly     Alcohol use 0.0 oz/week     0 Standard drinks or equivalent per week      Comment: 2-3  weekly     Family History   Problem Relation Age of Onset     Anesthesia Reaction No family hx of      Blood Disease No family hx of      C.A.D. No family hx of      Diabetes Paternal Grandmother      Diabetes Father      Hypertension Mother      Breast Cancer Maternal Grandmother      Cancer - colorectal No family hx of      Thyroid Disease Father      cancer of thyroid         Current Outpatient Prescriptions   Medication Sig Dispense Refill     CALCIUM-MAGNESIUM-ZINC PO 1 tablet daily       FLUoxetine  (PROZAC) 20 MG capsule Take 1 capsule (20 mg) by mouth daily 90 capsule 1     FLUoxetine (PROZAC) 40 MG capsule TAKE ONE CAPSULE BY MOUTH EVERY DAY WITH 20MG CAPSULE 90 capsule 1     fluticasone (FLONASE) 50 MCG/ACT spray Spray 1-2 sprays into both nostrils daily 1 Bottle 11     Multiple Vitamins-Minerals (CENTRUM SILVER PO) Take  by mouth.       omeprazole (PRILOSEC) 40 MG capsule TAKE ONE CAPSULE BY MOUTH EVERY DAY 30-60 MINUTES BEFORE A MEAL --PATIENT REQUESTS SANDOZ BRAND - Fill when requested 90 capsule 1     ondansetron (ZOFRAN-ODT) 4 MG ODT tab DISSOLVE TWO TABLETS ON TONGUE EVERY 8 HOURS AS NEEDED FOR NAUSEA 60 tablet 0     PROAIR  (90 Base) MCG/ACT inhaler INHALE ONE TO TWO PUFFS BY MOUTH FOUR TIMES A DAY AS NEEDED-DUE FOR ASTHMA FOLLOW UP 8.5 g 0     ZYRTEC 10 MG OR TABS ONE TABLET DAILY 1 month 2     Allergies   Allergen Reactions     Latex Itching     Lisinopril      Cough     Red Dye      BP Readings from Last 3 Encounters:   07/05/18 112/70   04/04/18 130/80   09/28/17 124/76    Wt Readings from Last 3 Encounters:   07/05/18 211 lb (95.7 kg)   04/04/18 213 lb (96.6 kg)   09/28/17 206 lb (93.4 kg)                    Reviewed and updated as needed this visit by clinical staff  Tobacco  Allergies  Meds  Med Hx  Surg Hx  Fam Hx  Soc Hx      Reviewed and updated as needed this visit by Provider         ROS:  Constitutional, HEENT, cardiovascular, pulmonary, gi and gu systems are negative, except as otherwise noted.    OBJECTIVE:     /70  Pulse 78  Temp 97.5  F (36.4  C) (Tympanic)  Resp 20  Wt 211 lb (95.7 kg)  LMP 07/20/2010 (Approximate)  SpO2 100%  Breastfeeding? No  BMI 34.9 kg/m2  Body mass index is 34.9 kg/(m^2).  GENERAL: healthy, alert and no distress  HENT: normal cephalic/atraumatic, ear canals and TM's normal, nose and mouth without ulcers or lesions, oral mucous membranes moist and tonsillar erythema  Sinus tenderness bilaterally   NECK: no adenopathy, no asymmetry,  masses, or scars and thyroid normal to palpation  RESP: no rales , no rhonchi and expiratory wheezes L mid posterior and L lower posterior  CV: regular rate and rhythm, normal S1 S2, no S3 or S4, no murmur, click or rub    Diagnostic Test Results:  none     ASSESSMENT/PLAN:         1. Mild intermittent asthma without complication  Symptoms seem to be allergy/sinus related.  Will treat for possible sinusitis and refer to ENT.  Consider PFTs in the furture.   - albuterol (PROAIR HFA) 108 (90 Base) MCG/ACT Inhaler; INHALE ONE TO TWO PUFFS BY MOUTH FOUR TIMES A DAY AS NEEDED  Dispense: 8.5 g; Refill: 1    2. Acute recurrent maxillary sinusitis  - fluticasone (FLONASE) 50 MCG/ACT spray; Spray 1-2 sprays into both nostrils daily  Dispense: 1 Bottle; Refill: 11  - amoxicillin-clavulanate (AUGMENTIN) 875-125 MG per tablet; Take 1 tablet by mouth 2 times daily  Dispense: 28 tablet; Refill: 0  - OTOLARYNGOLOGY REFERRAL    3. Nausea  - ondansetron (ZOFRAN-ODT) 4 MG ODT tab; DISSOLVE TWO TABLETS ON TONGUE EVERY 8 HOURS AS NEEDED FOR NAUSEA  Dispense: 60 tablet; Refill: 0    4. House dust mite allergy  - montelukast (SINGULAIR) 10 MG tablet; Take 1 tablet (10 mg) by mouth At Bedtime  Dispense: 90 tablet; Refill: 1    5. Allergic rhinitis due to animal dander  - montelukast (SINGULAIR) 10 MG tablet; Take 1 tablet (10 mg) by mouth At Bedtime  Dispense: 90 tablet; Refill: 1  - OTOLARYNGOLOGY REFERRAL    6. Allergy to mold spores  - montelukast (SINGULAIR) 10 MG tablet; Take 1 tablet (10 mg) by mouth At Bedtime  Dispense: 90 tablet; Refill: 1  - OTOLARYNGOLOGY REFERRAL    7. Excess skin of arm  Will refer to surgery to see about getting this removed.   - GENERAL SURG ADULT REFERRAL    8. Need for hepatitis C screening test  - Hepatitis C Screen Reflex to HCV RNA Quant and Genotype    9. Screen for STD (sexually transmitted disease)  - HIV Antigen Antibody Combo    See Patient Instructions  Will need asthma follow up in 1-2 months.   See ENT in the meantime.     CHARLENE Cardoso [facility]

## 2018-07-05 NOTE — TELEPHONE ENCOUNTER
I printed off a letter regarding this and placed it in my out basket.  Please fax. If they have a certain form they need, please get this for me to fill out.     Electronically signed by Sheryl Sparks CNP.

## 2018-07-05 NOTE — LETTER
[facility]  [address] [facility] [address]  Phone: [phone]    July 5, 2018        Yamel Martinez  [address]  [facility] [address]          To whom it may concern:    RE: Yamel Martinez    Patient was seen and treated today at our clinic.  She has excess skin on both upper arms that causes daily pain and difficulties with her activities of daily living and work.  In my opinion, she would medically benefit from surgery to remove this skin.     Please contact me for questions or concerns.      Sincerely,        CHARLENE Cardoso CNP

## 2018-07-05 NOTE — TELEPHONE ENCOUNTER
Reason for Call:  Medical Necessity Form     Detailed comments: Pt states in order for insurance to cover the skin/ lesion removal that was discussed at her OV today, they need a certificate of medical necessity form faxed to them at [phone]. Her insurance ID # is [id number] if you need it. Please call her with any questions.     Phone Number Patient can be reached at: Home number on file [phone] (home)    Best Time: any     Can we leave a detailed message on this number? YES    Call taken on 7/5/2018 at 1:08 PM by Gabriella Shaffer

## 2018-07-05 NOTE — TELEPHONE ENCOUNTER
Called and informed patient that I will be faxing letter, patient is going to contact us if they do not receive fax. Letter faxed.  Rayne James MA

## 2018-07-05 NOTE — PATIENT INSTRUCTIONS
Labs will be done today.   For normal results, you will receive a letter with the results in about 2 weeks.  If anything is abnormal or unexpected, someone from the clinic will call you.      Take the Augmentin as prescribed.    See Dr. Eddy  if you don't get better after that.     Stay on the Singulair.      Contact the general surgeon to see if they can remove the excess skin on your arms.

## 2018-07-05 NOTE — LETTER
July 6, 2018      Yamel Martinez  [address] [place] [address]        Dear ,    We are writing to inform you of your test results.    Your test results fall within the expected range(s) or remain unchanged from previous results.  Please continue with current treatment plan.    Resulted Orders   Hepatitis C Screen Reflex to HCV RNA Quant and Genotype   Result Value Ref Range    Hepatitis C Antibody Nonreactive NR^Nonreactive      Comment:      Assay performance characteristics have not been established for newborns,   infants, and children     HIV Antigen Antibody Combo   Result Value Ref Range    HIV Antigen Antibody Combo Nonreactive NR^Nonreactive          Comment:      HIV-1 p24 Ag & HIV-1/HIV-2 Ab Not Detected       If you have any questions or concerns, please call the clinic at the number listed above.       Sincerely,        CHARLENE Cardoso CNP

## 2018-07-05 NOTE — MR AVS SNAPSHOT
After Visit Summary   7/5/2018    Yamel Martinez    MRN: [id number]           Patient Information     Date Of Birth          1960        Visit Information        Provider Department      7/5/2018 10:40 AM Sheryl Sparks APRN [facility]        Today's Diagnoses     Excess skin of arm    -  1    Mild intermittent asthma without complication        Acute recurrent maxillary sinusitis        Nausea        House dust mite allergy        Allergic rhinitis due to animal dander        Allergy to mold spores        Need for hepatitis C screening test        Screen for STD (sexually transmitted disease)          Care Instructions    Labs will be done today.   For normal results, you will receive a letter with the results in about 2 weeks.  If anything is abnormal or unexpected, someone from the clinic will call you.      Take the Augmentin as prescribed.    See Dr. Eddy  if you don't get better after that.     Stay on the Singulair.      Contact the general surgeon to see if they can remove the excess skin on your arms.                   Follow-ups after your visit        Additional Services     GENERAL SURG ADULT REFERRAL       Your provider has referred you to: [facility] [phone]   [email or web address]    Please be aware that coverage of these services is subject to the terms and limitations of your health insurance plan.  Call member services at your health plan with any benefit or coverage questions.      Please bring the following with you to your appointment:    (1) Any X-Rays, CTs or MRIs which have been performed.  Contact the facility where they were done to arrange for  prior to your scheduled appointment.   (2) List of current medications   (3) This referral request   (4) Any documents/labs given to you for this referral            OTOLARYNGOLOGY REFERRAL       Your provider has referred you to: FMG:  [facility] [phone]   [email or web address]    Please be aware that coverage of these services is subject to the terms and limitations of your health insurance plan.  Call member services at your health plan with any benefit or coverage questions.      Please bring the following with you to your appointment:    (1) Any X-Rays, CTs or MRIs which have been performed.  Contact the facility where they were done to arrange for  prior to your scheduled appointment.   (2) List of current medications  (3) This referral request   (4) Any documents/labs given to you for this referral                  Who to contact     If you have questions or need follow up information about today's clinic visit or your schedule please contact [facility] directly at [phone].  Normal or non-critical lab and imaging results will be communicated to you by MyChart, letter or phone within 4 business days after the clinic has received the results. If you do not hear from us within 7 days, please contact the clinic through MyChart or phone. If you have a critical or abnormal lab result, we will notify you by phone as soon as possible.  Submit refill requests through [place] or call your pharmacy and they will forward the refill request to us. Please allow 3 business days for your refill to be completed.          Additional Information About Your Visit        Care EveryWhere ID     This is your Care EveryWhere ID. This could be used by other organizations to access your [place] medical records  [id number]        Your Vitals Were     Pulse Temperature Respirations Last Period Pulse Oximetry Breastfeeding?    78 97.5  F (36.4  C) (Tympanic) 20 07/20/2010 (Approximate) 100% No    BMI (Body Mass Index)                   34.9 kg/m2            Blood Pressure from Last 3 Encounters:   07/05/18 112/70   04/04/18 130/80   09/28/17 124/76    Weight from Last 3  Encounters:   07/05/18 211 lb (95.7 kg)   04/04/18 213 lb (96.6 kg)   09/28/17 206 lb (93.4 kg)              We Performed the Following     GENERAL SURG ADULT REFERRAL     Hepatitis C Screen Reflex to HCV RNA Quant and Genotype     HIV Antigen Antibody Combo     OTOLARYNGOLOGY REFERRAL          Today's Medication Changes          These changes are accurate as of 7/5/18 11:16 AM.  If you have any questions, ask your nurse or doctor.               Start taking these medicines.        Dose/Directions    amoxicillin-clavulanate 875-125 MG per tablet   Commonly known as:  AUGMENTIN   Used for:  Acute recurrent maxillary sinusitis   Started by:  Sheryl Sparks APRN CNP        Dose:  1 tablet   Take 1 tablet by mouth 2 times daily   Quantity:  28 tablet   Refills:  0         These medicines have changed or have updated prescriptions.        Dose/Directions    albuterol 108 (90 Base) MCG/ACT Inhaler   Commonly known as:  PROAIR HFA   This may have changed:  See the new instructions.   Used for:  Mild intermittent asthma without complication   Changed by:  Sheryl Sparks APRN CNP        INHALE ONE TO TWO PUFFS BY MOUTH FOUR TIMES A DAY AS NEEDED   Quantity:  8.5 g   Refills:  1            Where to get your medicines      These medications were sent to [facility] [place] [address]   [address] [place] [id number]     Phone:  [phone]     albuterol 108 (90 Base) MCG/ACT Inhaler    amoxicillin-clavulanate 875-125 MG per tablet    fluticasone 50 MCG/ACT spray    montelukast 10 MG tablet    ondansetron 4 MG ODT tab                Primary Care Provider Office Phone # Fax #    CHARLENE Cardoso [phone]       [address] [facility] [address]        Equal Access to Services     [facility] AH: Jeremiah Perez, wayadida luqadaha, qaybta kaalmada edgard, chase hawkins. Juliana [facility] [phone].    ATENCIÓN: Si seb gonzalez, nitesh ayers solis  disposición servicios gratuitos de asistencia lingüística. Ziyad matamoros [phone].    We comply with applicable federal civil rights laws and [place] laws. We do not discriminate on the basis of race, color, national origin, age, disability, sex, sexual orientation, or gender identity.            Thank you!     Thank you for choosing [facility]  for your care. Our goal is always to provide you with excellent care. Hearing back from our patients is one way we can continue to improve our services. Please take a few minutes to complete the written survey that you may receive in the mail after your visit with us. Thank you!             Your Updated Medication List - Protect others around you: Learn how to safely use, store and throw away your medicines at www.disposemymeds.org.          This list is accurate as of 7/5/18 11:16 AM.  Always use your most recent med list.                   Brand Name Dispense Instructions for use Diagnosis    albuterol 108 (90 Base) MCG/ACT Inhaler    PROAIR HFA    8.5 g    INHALE ONE TO TWO PUFFS BY MOUTH FOUR TIMES A DAY AS NEEDED    Mild intermittent asthma without complication       amoxicillin-clavulanate 875-125 MG per tablet    AUGMENTIN    28 tablet    Take 1 tablet by mouth 2 times daily    Acute recurrent maxillary sinusitis       CALCIUM-MAGNESIUM-ZINC PO      1 tablet daily    Mild intermittent asthma       CENTRUM SILVER PO      Take  by mouth.        * FLUoxetine 40 MG capsule    PROzac    90 capsule    TAKE ONE CAPSULE BY MOUTH EVERY DAY WITH 20MG CAPSULE    Major depressive disorder, recurrent episode, mild (H)       * FLUoxetine 20 MG capsule    PROzac    90 capsule    Take 1 capsule (20 mg) by mouth daily    Major depressive disorder, recurrent episode, mild (H)       fluticasone 50 MCG/ACT spray    FLONASE    1 Bottle    Spray 1-2 sprays into both nostrils daily    Acute recurrent maxillary sinusitis       montelukast 10 MG tablet    SINGULAIR    90 tablet     Take 1 tablet (10 mg) by mouth At Bedtime    House dust mite allergy, Allergic rhinitis due to animal dander, Allergy to mold spores       omeprazole 40 MG capsule    priLOSEC    90 capsule    TAKE ONE CAPSULE BY MOUTH EVERY DAY 30-60 MINUTES BEFORE A MEAL --PATIENT REQUESTS SANDOZ BRAND - Fill when requested    Gastroesophageal reflux disease without esophagitis       ondansetron 4 MG ODT tab    ZOFRAN-ODT    60 tablet    DISSOLVE TWO TABLETS ON TONGUE EVERY 8 HOURS AS NEEDED FOR NAUSEA    Nausea       ZYRTEC 10 MG tablet   Generic drug:  cetirizine     1 month    ONE TABLET DAILY    Allergic rhinitis, cause unspecified       * Notice:  This list has 2 medication(s) that are the same as other medications prescribed for you. Read the directions carefully, and ask your doctor or other care provider to review them with you.

## 2018-07-06 ENCOUNTER — OFFICE VISIT (OUTPATIENT)
Dept: SURGERY | Facility: CLINIC | Age: 58
End: 2018-07-06
Attending: NURSE PRACTITIONER
Payer: COMMERCIAL

## 2018-07-06 ENCOUNTER — TELEPHONE (OUTPATIENT)
Dept: FAMILY MEDICINE | Facility: OTHER | Age: 58
End: 2018-07-06

## 2018-07-06 VITALS — HEIGHT: 63 IN | BODY MASS INDEX: 37.28 KG/M2 | WEIGHT: 210.4 LBS

## 2018-07-06 DIAGNOSIS — L98.7 EXCESS SKIN OF ARM: ICD-10-CM

## 2018-07-06 DIAGNOSIS — E66.01 MORBID OBESITY (H): ICD-10-CM

## 2018-07-06 LAB
HCV AB SERPL QL IA: NONREACTIVE
HIV 1+2 AB+HIV1 P24 AG SERPL QL IA: NONREACTIVE

## 2018-07-06 PROCEDURE — 99214 OFFICE O/P EST MOD 30 MIN: CPT | Performed by: PLASTIC SURGERY

## 2018-07-06 ASSESSMENT — PAIN SCALES - GENERAL: PAINLEVEL: NO PAIN (0)

## 2018-07-06 ASSESSMENT — ASTHMA QUESTIONNAIRES: ACT_TOTALSCORE: 16

## 2018-07-06 NOTE — NURSING NOTE
"Yamel Martinez's goals for this visit include: consult for excess skin on arms and legs  She requests these members of her care team be copied on today's visit information:     PCP: Sheryl Sparks    Referring Provider:  CHARLENE Cardoso CNP  [address] [place] [address]    Ht 1.6 m (5' 3\")  Wt 95.4 kg (210 lb 6.4 oz)  LMP 07/20/2010 (Approximate)  BMI 37.27 kg/m2    Do you need any medication refills at today's visit? No    Katharina Jenkins LPN      "

## 2018-07-06 NOTE — PROGRESS NOTES
REFERRING PHYSICIAN:  CHARLENE Pyle, CNP      PRESENTING COMPLAINT:  Consultation for excess upper arm skin.      HISTORY OF PRESENT ILLNESS:  Ms. Martinez is 57 years old.  She is here to discuss surgical correction of excess upper arm skin.  She lost over 100 pounds from a sleeve gastrectomy done in 2014 for morbid obesity.  She weighed 317 pounds, currently weighs about 210 pounds and has been stable for over a year now.  The excess skin causes her a lot of issues including wearing clothes, exercising and getting rashes.  She is being followed by her primary care physician for these issues.  She is here to discuss options for surgical treatment.      PAST MEDICAL HISTORY:  Asthma, osteoarthritis, depression.      PAST SURGICAL HISTORY:  Hip replacement, gastric sleeve procedure, sinus surgery and vulvectomy for squamous cell carcinoma.      MEDICATIONS:  Albuterol, fluticasone, Singulair, fluoxetine, omeprazole.      ALLERGIES:  Lisinopril and latex.      SOCIAL HISTORY:  Does not smoke, rarely drinks.  She is a hairdresser.      REVIEW OF SYSTEMS:  Denies chest pain, shortness of breath, MI, CVA, DVT and PE.      PHYSICAL EXAMINATION:  Vital signs stable.  She is afebrile, in no obvious distress.  She is 5 feet 3 inches, 210 pounds, BMI 37 kg/m2.  On examination of her arm, she has excess skin over the upper arms and lateral chest area with a skin pinch thickness about 3-4 cm.      ASSESSMENT AND PLAN:  Based on above findings, a diagnosis of excess upper arm skin from massive weight loss was made.  I had a long discussion with the patient about brachioplasty.  I explained to her how that will be done, showed her where the scars would be, explained to her what to expect from the surgery.  Most importantly, I explained to her that wound complications, wound breakdowns, seromas and prominent scarring, numbness and edema are the main complications of this potential surgery.  Had a dayo conversation about prior  authorization and the insurance requirements versus private pay quotes were given to her.  My plan is to get prior authorization once she gets the issues that she is having from her primary care physician to substantiate them.  Consent obtained for gram stain.  All exam discussed in the presence of my nurse.  I look forward to helping her out in the future if she wants to proceed  based on discussion above.        Total time spent with patient 30 minutes, more than half was counseling.       cc:    CHARLENE Pyle, CNP   [facility]   [address] [place] [address]

## 2018-07-06 NOTE — LETTER
[facility]  [address] [facility] [address]  Phone: [phone]    July 12, 2018        Yamel Martinez  [address]  [facility] [address]          To whom it may concern:    RE: Yamel Martinez lost over 100 pounds from a sleeve gastrectomy done in 2014 for morbid obesity.  She weighed 317 pounds, currently weighs about 210 pounds and has been stable for over a year now.  The excess skin causes her a lot of issues including wearing clothes, exercising and getting rashes. 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 This causes severe pain and the excessive skin inhibits her ability to work for long periods.  She gets chronic rashes and skin breakdown, putting her at risk for infections.      Please contact me for questions or concerns.      Sincerely,        CHARLENE Cardoso CNP

## 2018-07-06 NOTE — TELEPHONE ENCOUNTER
She has constant pain in her arms and shoulders due to the excess skin.  It impacts her ability to do her job.  Do they need this on any certain forms?  Or a letter regarding this?    Electronically signed by Sheryl Sparks CNP.

## 2018-07-06 NOTE — TELEPHONE ENCOUNTER
Reason for Call:  Other question regarding surgery    Detailed comments:  [employer] needs to know why Yamel needs the extra skin removed from her arms and what her medical condition is. Please call Cher on Monday.    Phone Number Patient can be reached at: Other phone number:  [phone]*    Best Time:     Can we leave a detailed message on this number? YES    Call taken on 7/6/2018 at 3:38 PM by Enma Cochran

## 2018-07-06 NOTE — MR AVS SNAPSHOT
"              After Visit Summary   7/6/2018    Yamel Martinez    MRN: [id number]           Patient Information     Date Of Birth          1960        Visit Information        Provider Department      7/6/2018 1:30 PM JAKE Lechuga MD [facility]        Today's Diagnoses     Excess skin of arm        Morbid obesity (H)           Follow-ups after your visit        Follow-up notes from your care team     Return if symptoms worsen or fail to improve.      Who to contact     If you have questions or need follow up information about today's clinic visit or your schedule please contact [facility] directly at [phone].  Normal or non-critical lab and imaging results will be communicated to you by MyChart, letter or phone within 4 business days after the clinic has received the results. If you do not hear from us within 7 days, please contact the clinic through MyChart or phone. If you have a critical or abnormal lab result, we will notify you by phone as soon as possible.  Submit refill requests through [employer] or call your pharmacy and they will forward the refill request to us. Please allow 3 business days for your refill to be completed.          Additional Information About Your Visit        Care EveryWhere ID     This is your Care EveryWhere ID. This could be used by other organizations to access your [facility] medical records  [id number]        Your Vitals Were     Height Last Period BMI (Body Mass Index)             5' 3\" 07/20/2010 (Approximate) 37.27 kg/m2          Blood Pressure from Last 3 Encounters:   07/05/18 112/70   04/04/18 130/80   09/28/17 124/76    Weight from Last 3 Encounters:   07/06/18 210 lb 6.4 oz   07/05/18 211 lb   04/04/18 213 lb              Today, you had the following     No orders found for display       Primary Care Provider Office Phone # Fax #    CHARLENE Cardoso [phone]       [address] [facility] " [address]        Equal Access to Services     [facility]: Hadii carlos sanders nikkie Perez, waaxda luqadaha, qaybta kaalmada edgard, chase hawkins. So [facility] [phone].    ATENCIÓN: Si habla español, tiene a solis disposición servicios gratuitos de asistencia lingüística. Llame al [phone].    We comply with applicable federal civil rights laws and [place] laws. We do not discriminate on the basis of race, color, national origin, age, disability, sex, sexual orientation, or gender identity.            Thank you!     Thank you for choosing [facility]  for your care. Our goal is always to provide you with excellent care. Hearing back from our patients is one way we can continue to improve our services. Please take a few minutes to complete the written survey that you may receive in the mail after your visit with us. Thank you!             Your Updated Medication List - Protect others around you: Learn how to safely use, store and throw away your medicines at www.disposemymeds.org.          This list is accurate as of 7/6/18  6:44 PM.  Always use your most recent med list.                   Brand Name Dispense Instructions for use Diagnosis    albuterol 108 (90 Base) MCG/ACT Inhaler    PROAIR HFA    8.5 g    INHALE ONE TO TWO PUFFS BY MOUTH FOUR TIMES A DAY AS NEEDED    Mild intermittent asthma without complication       amoxicillin-clavulanate 875-125 MG per tablet    AUGMENTIN    28 tablet    Take 1 tablet by mouth 2 times daily    Acute recurrent maxillary sinusitis       CALCIUM-MAGNESIUM-ZINC PO      1 tablet daily    Mild intermittent asthma       CENTRUM SILVER PO      Take  by mouth.        * FLUoxetine 40 MG capsule    PROzac    90 capsule    TAKE ONE CAPSULE BY MOUTH EVERY DAY WITH 20MG CAPSULE    Major depressive disorder, recurrent episode, mild (H)       * FLUoxetine 20 MG capsule    PROzac    90 capsule    Take 1 capsule (20 mg) by mouth daily    Major  depressive disorder, recurrent episode, mild (H)       fluticasone 50 MCG/ACT spray    FLONASE    1 Bottle    Spray 1-2 sprays into both nostrils daily    Acute recurrent maxillary sinusitis       montelukast 10 MG tablet    SINGULAIR    90 tablet    Take 1 tablet (10 mg) by mouth At Bedtime    House dust mite allergy, Allergic rhinitis due to animal dander, Allergy to mold spores       omeprazole 40 MG capsule    priLOSEC    90 capsule    TAKE ONE CAPSULE BY MOUTH EVERY DAY 30-60 MINUTES BEFORE A MEAL --PATIENT REQUESTS SANDOZ BRAND - Fill when requested    Gastroesophageal reflux disease without esophagitis       ondansetron 4 MG ODT tab    ZOFRAN-ODT    60 tablet    DISSOLVE TWO TABLETS ON TONGUE EVERY 8 HOURS AS NEEDED FOR NAUSEA    Nausea       ZYRTEC 10 MG tablet   Generic drug:  cetirizine     1 month    ONE TABLET DAILY    Allergic rhinitis, cause unspecified       * Notice:  This list has 2 medication(s) that are the same as other medications prescribed for you. Read the directions carefully, and ask your doctor or other care provider to review them with you.

## 2018-07-09 ENCOUNTER — TELEPHONE (OUTPATIENT)
Dept: SURGERY | Facility: CLINIC | Age: 58
End: 2018-07-09

## 2018-07-09 NOTE — NURSING NOTE
Per Dr. Lechuga bilateral arm pictures to be taken. Pt identifier picture taken first, then arms to sides, arms held up both frontal and posterior positions pictures taken...Evy Chanel RN

## 2018-07-09 NOTE — TELEPHONE ENCOUNTER
----- Message from JAKE Lechuga MD sent at 7/6/2018  4:20 PM CDT -----  Regarding: Terra Garcia, please PA for bilateral brachioplasty. PCP may need to help with documentation. Please let me know once you get an answer.    Thanks!    Eduarda

## 2018-07-11 NOTE — TELEPHONE ENCOUNTER
Left msg for Cher to call the clinic. See Sheryl's note below.  ................Eric Ramos LPN,   July 11, 2018,      4:24 PM,   [facility]

## 2018-07-12 NOTE — TELEPHONE ENCOUNTER
Insurance would like a letter detailing the reason for the surgery.  I will fax once this is complete.    Veronica Gimenez XRO/  [facility]

## 2018-07-12 NOTE — TELEPHONE ENCOUNTER
For pt Yamel Martinez, [id number], code [id number], bilateral brachioplasty, would be used.    PA has been sent into [employer] insurance 07/12

## 2018-07-12 NOTE — TELEPHONE ENCOUNTER
Information faxed to [facility] [employer]  Fax# [phone]    Veronica Gimenez XRO/  [facility]

## 2018-07-26 DIAGNOSIS — L98.7 EXCESS SKIN OF ARM: Primary | ICD-10-CM

## 2018-07-26 RX ORDER — CEFAZOLIN SODIUM 1 G/50ML
1 INJECTION, SOLUTION INTRAVENOUS SEE ADMIN INSTRUCTIONS
Status: CANCELLED | OUTPATIENT
Start: 2018-07-26 | End: 2019-07-26

## 2018-07-26 NOTE — TELEPHONE ENCOUNTER
PA has been approved from [employer] insuranace, Auth# [id number] Bilateral Brachioplasty CPT: 07400-40    Spoke to Anya at [facility] [employer] insurance, patient has a deductible of $600 met $0 and a maximum out of pocket of $2500 and met $125 so far for 2018 year. Plan year starts over on 09/01/18 Call ref. #[id number] Anya 07/26/18 8:45am       Spoke to Yamel about approval and benefits, she would like to get this scheduled for after 09/01/18 so it starts over on her benefits. If you can call her to get her on the books that would be great.

## 2018-07-30 NOTE — TELEPHONE ENCOUNTER
Date Scheduled: 9-7-18  Facility: [facility]  Surgeon: Dr. Lechuga   Post-op appointment scheduled:    scheduled?: No  Surgery packet/instructions confirmed received?  Yes  Special Considerations:   Brittnee Mclean, Surgery Scheduling Coordinator       able to able to perform strenuous sports

## 2018-07-30 NOTE — TELEPHONE ENCOUNTER
Surgery Instructions    Always follow your surgeon s instructions. If you don t, your surgery could be cancelled. Please use the following checklist.  Your surgery is on: The surgery scheduler will contact you within 1 week of your consult with the surgeon. If you do not hear from them, please call the clinic or RN Care Coordinator for your provider.    Time: Prearrival times can vary depending on location/type of surgery.  [place] - 2 hour pre-arrival  [facility] - 2 hour pre-arrival  [place] - 1 hour pre-arrival    Note:  These times may change. A nurse will call you before surgery to confirm. If you have not received a call or if you have more questions, please call us on the working day before your surgery:  ? [place]: [phone] or [phone] (9am to 5:30pm)  ? [facility]: [phone] (8am to 6pm)  ? [place]: [phone] (9am to 5pm)  ? [facility] [phone] (7am to 4pm)  Prior to surgery  ? Have a pre-op physical exam with your Primary Doctor within 30 days of surgery  - Ask your doctor to send all of your results to the surgery center/hospital before surgery. Your doctor also may ask you to bring the results with you on the day of surgery.  - Tell your doctor if:  - You are allergic to latex or rubber (latex and rubber gloves are often used in medical care).  - You are taking any medicines (including aspirin), vitamins, or herbal products. You may need to stop taking some medicines before surgery.  - You have any medical problems (allergies, diabetes, or heart disease, for example).  - You have a pacemaker or an AICD (automatic implanted cardiac defibrillator). If you do, please bring the ID card with you on the day of surgery.  - People who smoke have a higher risk of infection after surgery. Ask your doctor how you can quit smoking.  - If you Primary Doctor is not within the [facility] system, you will need to have your pre-op physical faxed to us to be scanned into your  chart.  - [place]: [phone] or [phone]  - [facility]): [phone]  - [facility]): [phone]  ? Call your insurance company. Ask if you need pre-approval for your surgery. If you do not have insurance, please let us know. If you wish to speak to the , please alert the clinic staff so this can be arranged.  ? Arrange for someone to drive you home after surgery.  will need to be a responsible adult (18 years or older) that will provide transportation to and from surgery and stay in the waiting room during your surgery. You may not drive yourself or take public transportation to and from surgery.  ? Arrange for someone to stay with you for 24 hours after you go home. This person must be a responsible adult (18 years or older).  ? Call your surgeon or their nurse if there is any change in your health (cold, flu, infections, hospitalizations).  ? Do not smoke, drink alcohol, or take over-the-counter medicine for 24 hours before and after surgery.  ? If you take prescribed drugs, you may need to stop them until after the surgery.  Discuss what medications to take or not take prior to surgery with your Primary Doctor at your pre-op physical. Avoid over-the-counter blood-thinning medications such as Aspirin, Ibuprofen, vitamin E, or fish oil 7 days prior to surgery (unless otherwise directed by your Primary Doctor). Tylenol is a good alternative for mild pain relief prior to surgery.  ? Eating and drinking guidelines prior to surgery:  - Stop all solid food consumption 8 hours prior to surgery  - You may drink clear liquids up to 2 hours prior to surgery (water, fruit juices without pulp, jello, tea/coffee without creamer, sports drinks, clear-fat free broth (bouillon or consomme), popsicles (without milk, bits of fruit, or seeds/nuts)  ? Follow instructions given for showering or bathing before surgery.    - Use 8 ounces of antiseptic surgical soap,  like:  - Hibiclens, Scrub Care, or Exidine  - You can find it at your local pharmacy, clinic, or retail store. If you have trouble, ask your pharmacist to help you find the right substitute.  - Please wash with one of the above soaps twice before coming to the hospital for your surgery. This will decrease bacteria (germs) on your skin. It will also help reduce your chance of infection after surgery.  - Items you will need for showering:  - 4 newly washed washcloths  - 2 newly washed towels  - 8 ounces of one of the above soaps  - Following these instructions:  - The evening before surgery: Shower or bathe as you normally would, using your regular soap and a clean washcloth. Give special attention to places where your incision (surgical cut) or catheters will be. This includes your groin area. Rinse well. You may wash your hair with your regular shampoo. Next, wash your body with 4 ounces of the antiseptic soap. Use a clean, damp washcloth and gently clean your body (from the chin down). If your surgery involves your head, use the special soap on your head and scalp. Rinse well and dry off using a newly washed towel.  - The morning of surgery: Repeat the same process as the evening shower.  - Other suggestions:    Do not shave within 12 inches of your incision (surgical cut) area for at least 3 days before surgery. Shaving can make small cuts in the skin. This puts you at higher risk of infection.    Wear freshly washed pajamas or clothing after your evening shower.    Wear freshly washed clothes the day of surgery.    Wash and change your bed sheets the day before surgery to have clean bed sheets after your shower and when you get home from surgery.    If you have trouble washing all areas, make sure someone helps you.    Don't use any deodorant, lotion or powder after your shower.    Women who are menstruating should wear a fresh sanitary pad to the hospital.  ? Do not wear or add deodorant, cologne, lotion,  makeup, nail polish or jewelry to surgery. If you wear fake nails, please remove at least one nail before coming to surgery (an oxygen monitor needs to be placed on your finger during surgery).  ? Bring these items to the surgery center/hospital:  - Insurance card  - Money for parking and co-pays, if needed  - A list of all the medicines you take. Include vitamins, minerals, herbs, and over-the-counter drugs.  Note any drug allergies.  - A copy of your advance health care directive, if you have one. This tells us what treatment you would want--and who would make health care decisions--if you could no longer speak for yourself. You may request this form in advance or download it from [email or web address].  - A case for glasses, contact lenses, hearing aids, or dentures.  - Your inhaler or CPAP machine, if you use these at home.  ? Leave extra cash, jewelry, and other valuables at home.  When you arrive  When you get to the surgery center/hospital, you will:  ? Check in. If you are under age 18, you must be with a parent or legal guardian.  ? Sign consent forms, if you haven t already. These forms state that you know the risks and benefits of surgery. When you sign the forms, you give us permission to do the surgery. Do not sign them unless you understand what will happen during and after your surgery. If you have any questions about your surgery, ask to speak with your doctor before you sign the forms. If you don t understand the answers, ask again.  ? Receive a copy of the Patient s Bill of Rights. If you do not receive a copy, please ask for one.  ? Change into hospital clothes. Your belongings will be placed in a bag. We will return them to you after surgery.  ? Meet with the anesthesia provider. He or she will tell you what kind of anesthesia (medicine) will be used to keep you comfortable during surgery.  Remember: it s okay to remind doctors and nurses to wash their hands before touching you.  In most  cases, your surgeon will use a marker to write his or her initials on the surgery site. This ensures that the exact site is operated on.  For safety reasons, we will ask you the same questions many times. For example, we may ask your name and birth date over and over again.  Friends and family can stay with you until it s time for surgery. While you re in surgery, they will be in the waiting area. Please note that cell phones are not allowed in some patient care areas.  If you have questions about what will happen in the operating room, talk to your care team.  After surgery  We will move you to a recovery room, where we will watch you closely. If you have any pain or discomfort, tell your nurse. He or she will try to make you comfortable.  If you are staying overnight, we will move you to your hospital room after you are awake.  If you are going home, we will move you to another room. Friends and family may be able to join you. The length of time you spend in recovery depend on the type of medicine you received, your medical condition, the type of surgery you had, or your response to the anesthesia given during your procedure.  When you are discharged from the recovery room, the nurses will review instructions with you and your caregiver.  ? Please wash your hands every time you touch the wound or change bandages or dressings.  ? Do not submerge the wound in water.  You may not use a bathtub or hot tub until the wound is closed. The wait time frame is generally 2-3 weeks, but any open area can be a source of incoming bacteria, so it is better to be on the safe side and avoid water submersion until your wound is fully healed.  ? You may take a shower 24 hours after surgery. Double check with your surgeon if it is OK for water to run over the wound, whether it has been sutured, stapled, glued, or is open. You may gently wash the wound using the antiseptic soap provided for your pre-surgery showering (do not use a  washcloth). Any mild soap will work as well.  ? Many surgical wounds will have small white strips of tape on them called steri-strips.  Do not remove these. The edges will curl and fall off within 7-10 days with normal showering.  ? If you are going home with sutures (stitches) or staples, you must return to the clinic to have them taken out, usually within 1-2 weeks. Some stitches are dissolvable and do not require removal. Make sure to clarify with your surgeon or surgery nurse reviewing discharge paperwork what kind of sutures you have.  ? Signs and symptoms of infection include:  - Fever, temperature over 101.5   F  - Redness  - Swelling  - Increased pain  - Green or yellow drainage which may or may not have a foul odor  Dealing with pain  A nurse will check your comfort level often during your stay. He or she will work with you to manage your pain.  Remember:  ? All pain is real. There are many ways to control pain. We can help you decide what works best for you.  ? Ask for pain medicine when you need it. Don t try to  tough it out --this can make you feel worse. Always take your medicine as ordered.  ? Medicine doesn t work the same for everyone. If your medicine isn t working, tell your nurse. There may be other medicines or treatments we can try.  Going home  We will let you know when you re ready to leave the surgery center or hospital. Before you leave, we will tell you how to care for yourself at home and prevent infections. If you do not understand something, please say so. We will answer any questions you have. We will then help you get ready to leave.  Remember, you must have a responsible adult (18 years or older) to stay with you 24 hours after you leave the hospital.  Take it easy when you get home. You will need some time to recover--you may be more tired than you realize at first. Rest and relax for at least the first 24 hours at home. You ll feel better and heal faster if you take good care of  yourself.  Follow the discharge instructions that are given to you when you leave the surgery center or hospital  Please call the clinic if you experience any problems during regular clinic hours (Monday-Friday 8:00am-5:00pm).  If you experience problems during non-clinic hours, please call the [facility] on-call line at [phone] and ask the  to page the on-call Provider for your specialty. The on-call Provider will call you back and can triage your symptoms and further advise. If you are having an emergency, always call 911 or seek immediate evaluation at the Emergency Room.  Locations  [facility]  Same-day surgery center - 2nd floor, check-in #5  [address] [place] [address]  [phone]  www.[facility].[place].[facility] ([facility])  [address] [place], [place] [address] [phone]   [email or web address]/locations/[email or web address]/[email or web address]    [address] [address] [phone] (patient registration)  [phone] (main line)  www.[facility].org  [facility]  [address] [place] [address]  [facility], 3rd floor for check-in  [phone] (patient registration)  [phone] (main line)  www.[facility].org  [facility]  [address] [address]  [phone]  www.[facility]  [address] [facility] [place] [address]  [phone]  www.[facility].[place].[facility]  [address] [place] [address]  [phone]  www.[facility]  [address]  [place] [address]  [phone]  www.[facility].[place].[facility]  [address]  [place] [address]  [phone]  www.[facility]org  [address] [address]  [phone]  [email or web address].[facility]

## 2018-08-20 ENCOUNTER — OFFICE VISIT (OUTPATIENT)
Dept: FAMILY MEDICINE | Facility: OTHER | Age: 58
End: 2018-08-20
Payer: COMMERCIAL

## 2018-08-20 ENCOUNTER — RADIANT APPOINTMENT (OUTPATIENT)
Dept: GENERAL RADIOLOGY | Facility: OTHER | Age: 58
End: 2018-08-20
Attending: NURSE PRACTITIONER
Payer: COMMERCIAL

## 2018-08-20 VITALS
OXYGEN SATURATION: 100 % | WEIGHT: 208.4 LBS | HEART RATE: 84 BPM | TEMPERATURE: 97.5 F | HEIGHT: 65 IN | BODY MASS INDEX: 34.72 KG/M2 | SYSTOLIC BLOOD PRESSURE: 118 MMHG | DIASTOLIC BLOOD PRESSURE: 82 MMHG | RESPIRATION RATE: 18 BRPM

## 2018-08-20 DIAGNOSIS — M79.672 LEFT FOOT PAIN: ICD-10-CM

## 2018-08-20 DIAGNOSIS — M79.672 LEFT FOOT PAIN: Primary | ICD-10-CM

## 2018-08-20 PROCEDURE — 99213 OFFICE O/P EST LOW 20 MIN: CPT | Performed by: NURSE PRACTITIONER

## 2018-08-20 PROCEDURE — 73630 X-RAY EXAM OF FOOT: CPT | Mod: LT

## 2018-08-20 RX ORDER — METHYLPREDNISOLONE 4 MG
TABLET, DOSE PACK ORAL
Qty: 21 TABLET | Refills: 0 | Status: SHIPPED | OUTPATIENT
Start: 2018-08-20 | End: 2018-09-05

## 2018-08-20 ASSESSMENT — PAIN SCALES - GENERAL: PAINLEVEL: SEVERE PAIN (6)

## 2018-08-20 NOTE — PROGRESS NOTES
SUBJECTIVE:   Yamel Martinez is a 57 year old female who presents to clinic today for the following health issues:      Musculoskeletal problem/pain      Duration: 1 week    Description  Location: left foot    Intensity:  moderate, severe, 6/10 now, 10/10 at worst    Accompanying signs and symptoms: swelling    History  Previous similar problem: no   Previous evaluation:  none    Precipitating or alleviating factors:  Trauma or overuse: no   Aggravating factors include: standing, walking, climbing stairs and overuse    Therapies tried and outcome: rest/inactivity, heat, ice and different shoes; nothing is helping.    Stands at work for long periods of time.  This started swelling and hurting after standing all day at work.  It is getting worse.   Worse at the end of the day after standing all day.   No calf pain or ankle pain  No history of DVT, gout or problems with the left foot in the past.      Problem list and histories reviewed & adjusted, as indicated.  Additional history: as documented    Patient Active Problem List   Diagnosis     Mild intermittent asthma without complication     CARDIOVASCULAR SCREENING; LDL GOAL LESS THAN 160     Chronic maxillary sinusitis     Major depressive disorder, recurrent episode, mild (H)     Diagnostic skin and sensitization tests     Allergic rhinitis due to animal dander     House dust mite allergy     Allergy to mold spores     Post-operative pain     Vulvar cancer (H)     Hypertension goal BP (blood pressure) < 140/90     Asymptomatic varicose veins     Gastroesophageal reflux disease without esophagitis     Hip pain, right     Seasonal allergic rhinitis     Osteoarthritis of right hip, unspecified osteoarthritis type     Arthritis of right hip     S/P total hip arthroplasty     Primary osteoarthritis of left shoulder     Excess skin of arm     Morbid obesity (H)     Past Surgical History:   Procedure Laterality Date     ARTHROPLASTY HIP Right 3/28/2016    Procedure:  ARTHROPLASTY HIP;  Surgeon: Juan Espinoza MD;  Location: [facility]     C LIGATE FALLOPIAN TUBE       C NONSPECIFIC PROCEDURE      Sinus surgery cyst and septum     C NONSPECIFIC PROCEDURE      Vein closure     HC COLP CERVIX/UPPER VAGINA W LOOP ELEC BX CERVIX  1999     INCISION AND DRAINAGE HIP, COMBINED  2010    Post operative     JOINT REPLACEMENT, HIP RT/LT  2010    Joint Replacement Hip LT     JOINT REPLACEMENT, HIP RT/LT  01/07/13    left total hip arthroplasty     VULVECTOMY RADICAL (NO GROIN DISSECTION)  4/18/2013    Procedure: VULVECTOMY RADICAL (NO GROIN DISSECTION);  Left radical Vulvectomy ;  Surgeon: Gloria Broussard MD;  Location[facility]       Social History   Substance Use Topics     Smoking status: Former Smoker     Packs/day: 0.50     Quit date: 1/1/2013     Smokeless tobacco: Never Used      Comment: Quit 3 yrs ago, however smokes up to 5 cigarettes weekly     Alcohol use 0.0 oz/week     0 Standard drinks or equivalent per week      Comment: 2-3  weekly     Family History   Problem Relation Age of Onset     Anesthesia Reaction No family hx of      Blood Disease No family hx of      C.A.D. No family hx of      Diabetes Paternal Grandmother      Diabetes Father      Hypertension Mother      Breast Cancer Maternal Grandmother      Cancer - colorectal No family hx of      Thyroid Disease Father      cancer of thyroid         Current Outpatient Prescriptions   Medication Sig Dispense Refill     albuterol (PROAIR HFA) 108 (90 Base) MCG/ACT Inhaler INHALE ONE TO TWO PUFFS BY MOUTH FOUR TIMES A DAY AS NEEDED 8.5 g 1     CALCIUM-MAGNESIUM-ZINC PO 1 tablet daily       FLUoxetine (PROZAC) 20 MG capsule Take 1 capsule (20 mg) by mouth daily 90 capsule 1     FLUoxetine (PROZAC) 40 MG capsule TAKE ONE CAPSULE BY MOUTH EVERY DAY WITH 20MG CAPSULE 90 capsule 1     fluticasone (FLONASE) 50 MCG/ACT spray Spray 1-2 sprays into both nostrils daily 1 Bottle 11     montelukast (SINGULAIR) 10 MG tablet Take 1 tablet (10  "mg) by mouth At Bedtime 90 tablet 1     Multiple Vitamins-Minerals (CENTRUM SILVER PO) Take  by mouth.       omeprazole (PRILOSEC) 40 MG capsule TAKE ONE CAPSULE BY MOUTH EVERY DAY 30-60 MINUTES BEFORE A MEAL --PATIENT REQUESTS [employer] BRAND - Fill when requested 90 capsule 1     ondansetron (ZOFRAN-ODT) 4 MG ODT tab DISSOLVE TWO TABLETS ON TONGUE EVERY 8 HOURS AS NEEDED FOR NAUSEA 60 tablet 0     ZYRTEC 10 MG OR TABS ONE TABLET DAILY 1 month 2     Allergies   Allergen Reactions     Latex Itching     Lisinopril      Cough     Red Dye      BP Readings from Last 3 Encounters:   08/20/18 118/82   07/05/18 112/70   04/04/18 130/80    Wt Readings from Last 3 Encounters:   08/20/18 208 lb 6.4 oz (94.5 kg)   07/06/18 210 lb 6.4 oz (95.4 kg)   07/05/18 211 lb (95.7 kg)                    Reviewed and updated as needed this visit by clinical staff  Tobacco  Allergies  Meds       Reviewed and updated as needed this visit by Provider         ROS:  Constitutional, HEENT, cardiovascular, pulmonary, gi and gu systems are negative, except as otherwise noted.    OBJECTIVE:     /82  Pulse 84  Temp 97.5  F (36.4  C) (Tympanic)  Resp 18  Ht 5' 5\" (1.651 m)  Wt 208 lb 6.4 oz (94.5 kg)  LMP 07/20/2010 (Approximate)  SpO2 100%  Breastfeeding? No  BMI 34.68 kg/m2  Body mass index is 34.68 kg/(m^2).  GENERAL: alert, no distress and obese  RESP: lungs clear to auscultation - no rales, rhonchi or wheezes  CV: regular rate and rhythm, normal S1 S2, no S3 or S4, no murmur, click or rub, no peripheral edema and peripheral pulses strong  MS: left foot: swelling on the top of the foot.  Tenderness to palpation along the medial aspect.  No erythema or warmth.  No ankle pain - has full ROM of the ankle.  No calf tenderness.  She has several varicose veins, worse on the left side   SKIN: no suspicious lesions or rashes    Diagnostic Test Results:  Xray - left foot: no acute findings.  Reviewed by myself, pending radiology review. "     ASSESSMENT/PLAN:         1. Left foot pain  Will treat for inflammation with oral steroids as prescribed.  Likely exacerbated by standing all day at her job.  If symptoms fail to improve, would refer to podiatry.  She has several varicose veins in the left leg and that is likely contributing to her edema.  She has no symptoms of DVT.  Follow up if symptoms fail to resolve as expected.   - XR Foot Left G/E 3 Views; Future  - methylPREDNISolone (MEDROL DOSEPAK) 4 MG tablet; Follow package instructions  Dispense: 21 tablet; Refill: 0    See Patient Instructions    CHARLENE Cardoso [facility]

## 2018-08-20 NOTE — MR AVS SNAPSHOT
After Visit Summary   8/20/2018    Yamel Martinez    MRN: [id number]           Patient Information     Date Of Birth          1960        Visit Information        Provider Department      8/20/2018 10:40 AM Sheryl Sparks APRN CNP [facility]        Today's Diagnoses     Left foot pain    -  1      Care Instructions    I did not see any abnormalities on your x-ray.  The radiologist will review it as well and they will call if they see anything I did not see.     Take the Medrol dose pack as prescribed.  Take with food.                Follow-ups after your visit        Your next 10 appointments already scheduled     Aug 27, 2018  9:40 AM CDT   Pre-[facility] physical with CHARLENE Cardoso CNP   [facility]    [address] [facility] [id number] [phone]            Sep 07, 2018   Procedure with JAKE Lechuga MD   [facility] (--)    [address]  [facility] [address] [phone]              Who to contact     If you have questions or need follow up information about today's clinic visit or your schedule please contact [facility] directly at [phone].  Normal or non-critical lab and imaging results will be communicated to you by MyChart, letter or phone within 4 business days after the clinic has received the results. If you do not hear from us within 7 days, please contact the clinic through MyChart or phone. If you have a critical or abnormal lab result, we will notify you by phone as soon as possible.  Submit refill requests through [employer] or call your pharmacy and they will forward the refill request to us. Please allow 3 business days for your refill to be completed.          Additional Information About Your Visit        Care EveryWhere ID     This is your Care EveryWhere ID. This could be used by other organizations to access your [facility] medical records  [id number]       "  Your Vitals Were     Pulse Temperature Respirations Height Last Period Pulse Oximetry    84 97.5  F (36.4  C) (Tympanic) 18 5' 5\" (1.651 m) 07/20/2010 (Approximate) 100%    Breastfeeding? BMI (Body Mass Index)                No 34.68 kg/m2           Blood Pressure from Last 3 Encounters:   08/20/18 118/82   07/05/18 112/70   04/04/18 130/80    Weight from Last 3 Encounters:   08/20/18 208 lb 6.4 oz (94.5 kg)   07/06/18 210 lb 6.4 oz (95.4 kg)   07/05/18 211 lb (95.7 kg)                 Today's Medication Changes          These changes are accurate as of 8/20/18 11:33 AM.  If you have any questions, ask your nurse or doctor.               Start taking these medicines.        Dose/Directions    methylPREDNISolone 4 MG tablet   Commonly known as:  MEDROL DOSEPAK   Used for:  Left foot pain   Started by:  Sheryl Sparks APRN CNP        Follow package instructions   Quantity:  21 tablet   Refills:  0            Where to get your medicines      These medications were sent to [facility] [place] [address]   [address] [facility] [address]     Phone:  [phone]     methylPREDNISolone 4 MG tablet                Primary Care Provider Office Phone # Fax #    CHARLENE Cardoso [phone]       [address] [facility] [address]        Equal Access to Services     RICK SEGURA AH: Hadii aad ku hadasho Soomaali, waaxda luqadaha, qaybta kaalmada adeegyada, waxay victoria haymartha hawkins. So [facility] [phone].    ATENCIÓN: Si habla español, tiene a solis disposición servicios gratuitos de asistencia lingüística. Ziyad matamoros [phone].    We comply with applicable federal civil rights laws and [place] laws. We do not discriminate on the basis of race, color, national origin, age, disability, sex, sexual orientation, or gender identity.            Thank you!     Thank you for choosing [facility]  for your care. Our goal is always to provide you with excellent " care. Hearing back from our patients is one way we can continue to improve our services. Please take a few minutes to complete the written survey that you may receive in the mail after your visit with us. Thank you!             Your Updated Medication List - Protect others around you: Learn how to safely use, store and throw away your medicines at www.disposemymeds.org.          This list is accurate as of 8/20/18 11:33 AM.  Always use your most recent med list.                   Brand Name Dispense Instructions for use Diagnosis    albuterol 108 (90 Base) MCG/ACT inhaler    PROAIR HFA    8.5 g    INHALE ONE TO TWO PUFFS BY MOUTH FOUR TIMES A DAY AS NEEDED    Mild intermittent asthma without complication       CALCIUM-MAGNESIUM-ZINC PO      1 tablet daily    Mild intermittent asthma       CENTRUM SILVER PO      Take  by mouth.        * FLUoxetine 40 MG capsule    PROzac    90 capsule    TAKE ONE CAPSULE BY MOUTH EVERY DAY WITH 20MG CAPSULE    Major depressive disorder, recurrent episode, mild (H)       * FLUoxetine 20 MG capsule    PROzac    90 capsule    Take 1 capsule (20 mg) by mouth daily    Major depressive disorder, recurrent episode, mild (H)       fluticasone 50 MCG/ACT spray    FLONASE    1 Bottle    Spray 1-2 sprays into both nostrils daily    Acute recurrent maxillary sinusitis       methylPREDNISolone 4 MG tablet    MEDROL DOSEPAK    21 tablet    Follow package instructions    Left foot pain       montelukast 10 MG tablet    SINGULAIR    90 tablet    Take 1 tablet (10 mg) by mouth At Bedtime    House dust mite allergy, Allergic rhinitis due to animal dander, Allergy to mold spores       omeprazole 40 MG capsule    priLOSEC    90 capsule    TAKE ONE CAPSULE BY MOUTH EVERY DAY 30-60 MINUTES BEFORE A MEAL --PATIENT REQUESTS [employer] BRAND - Fill when requested    Gastroesophageal reflux disease without esophagitis       ondansetron 4 MG ODT tab    ZOFRAN-ODT    60 tablet    DISSOLVE TWO TABLETS ON TONGUE  EVERY 8 HOURS AS NEEDED FOR NAUSEA    Nausea       ZYRTEC 10 MG tablet   Generic drug:  cetirizine     1 month    ONE TABLET DAILY    Allergic rhinitis, cause unspecified       * Notice:  This list has 2 medication(s) that are the same as other medications prescribed for you. Read the directions carefully, and ask your doctor or other care provider to review them with you.

## 2018-08-20 NOTE — PATIENT INSTRUCTIONS
I did not see any abnormalities on your x-ray.  The radiologist will review it as well and they will call if they see anything I did not see.     Take the Medrol dose pack as prescribed.  Take with food.

## 2018-08-21 ASSESSMENT — ASTHMA QUESTIONNAIRES: ACT_TOTALSCORE: 21

## 2018-08-22 NOTE — PROGRESS NOTES
Called pt, left msg informing of xray result/recommendations.  ................Eric Ramos LPN,   August 22, 2018,      2:21 PM,   [facility]

## 2018-08-27 ENCOUNTER — OFFICE VISIT (OUTPATIENT)
Dept: FAMILY MEDICINE | Facility: OTHER | Age: 58
End: 2018-08-27
Payer: COMMERCIAL

## 2018-08-27 VITALS
SYSTOLIC BLOOD PRESSURE: 102 MMHG | DIASTOLIC BLOOD PRESSURE: 66 MMHG | BODY MASS INDEX: 35.16 KG/M2 | TEMPERATURE: 98.5 F | HEIGHT: 65 IN | HEART RATE: 84 BPM | WEIGHT: 211 LBS | RESPIRATION RATE: 20 BRPM | OXYGEN SATURATION: 100 %

## 2018-08-27 DIAGNOSIS — L98.7 EXCESS SKIN OF ARM: ICD-10-CM

## 2018-08-27 DIAGNOSIS — Z01.818 PREOP GENERAL PHYSICAL EXAM: Primary | ICD-10-CM

## 2018-08-27 DIAGNOSIS — E66.01 MORBID OBESITY (H): ICD-10-CM

## 2018-08-27 PROCEDURE — 99214 OFFICE O/P EST MOD 30 MIN: CPT | Performed by: NURSE PRACTITIONER

## 2018-08-27 PROCEDURE — 93000 ELECTROCARDIOGRAM COMPLETE: CPT | Performed by: NURSE PRACTITIONER

## 2018-08-27 ASSESSMENT — PAIN SCALES - GENERAL: PAINLEVEL: NO PAIN (0)

## 2018-08-27 NOTE — PROGRESS NOTES
[place] [address] [facility] [address]  [phone]  Dept: [phone]    PRE-OP EVALUATION:  Today's date: 2018    Yamel Martinez (: 1960) presents for pre-operative evaluation assessment as requested by Dr. Lechuga.  She requires evaluation and anesthesia risk assessment prior to undergoing surgery/procedure for treatment of liposuction .    Proposed Surgery/ Procedure: Liposuction bilateral brachioplasty  Date of Surgery/ Procedure: 18  Time of Surgery/ Procedure: 0945  Hospital/Surgical Facility: [facility]  Fax available on [facility]  Primary Physician: Sheryl Sparks  Type of Anesthesia Anticipated: General    Patient has a Health Care Directive or Living Will:  NO    1. NO - Do you have a history of heart attack, stroke, stent, bypass or surgery on an artery in the head, neck, heart or legs?  2. NO - Do you ever have any pain or discomfort in your chest?  3. NO - Do you have a history of  Heart Failure?  4. NO - Are you troubled by shortness of breath when: walking on the level, up a slight hill or at night?  5. NO - Do you currently have a cold, bronchitis or other respiratory infection?  6. NO - Do you have a cough, shortness of breath or wheezing?  7. NO - Do you sometimes get pains in the calves of your legs when you walk?  8. NO - Do you or anyone in your family have previous history of blood clots?  9. NO - Do you or does anyone in your family have a serious bleeding problem such as prolonged bleeding following surgeries or cuts?  10. YES - HAVE YOU EVER HAD PROBLEMS WITH ANEMIA OR BEEN TOLD TO TAKE IRON PILLS? After hip surgery  11. NO - Have you had any abnormal blood loss such as black, tarry or bloody stools, or abnormal vaginal bleeding?  12. YES - HAVE YOU EVER HAD A BLOOD TRANSFUSION? With hip surgery   13. NO - Have you or any of your relatives ever had problems with anesthesia?  14. NO - Do you have sleep apnea, excessive snoring or  daytime drowsiness?  15. NO - Do you have any prosthetic heart valves?  16. YES - DO YOU HAVE PROSTHETIC JOINTS? Bilateral hips   17. NO - Is there any chance that you may be pregnant?      HPI:     HPI related to upcoming procedure: s/p gastric bypass surgery and significant weight loss.  Has excess skin on bilateral arms that causes pain.        See problem list for active medical problems.  Problems all longstanding and stable, except as noted/documented.  See ROS for pertinent symptoms related to these conditions.                                                                                                                                                          .    MEDICAL HISTORY:     Patient Active Problem List    Diagnosis Date Noted     Excess skin of arm 07/06/2018     Priority: Medium     Morbid obesity (H) 07/06/2018     Priority: Medium     Primary osteoarthritis of left shoulder 01/16/2017     Priority: Medium     S/P total hip arthroplasty 03/28/2016     Priority: Medium     Arthritis of right hip 02/04/2016     Priority: Medium     Hip pain, right 02/02/2016     Priority: Medium     Seasonal allergic rhinitis 02/02/2016     Priority: Medium     Osteoarthritis of right hip, unspecified osteoarthritis type 02/02/2016     Priority: Medium     Gastroesophageal reflux disease without esophagitis 09/09/2015     Priority: Medium     Hypertension goal BP (blood pressure) < 140/90 10/14/2013     Priority: Medium     Asymptomatic varicose veins 10/14/2013     Priority: Medium     Post-operative pain 04/18/2013     Priority: Medium     Vulvar cancer (H) 04/18/2013     Priority: Medium     Diagnostic skin and sensitization tests      Priority: Medium     Allergic rhinitis due to animal dander      Priority: Medium     House dust mite allergy      Priority: Medium     Allergy to mold spores      Priority: Medium     4/17/12 RAST pos. for dog/DM/M only       Major depressive disorder, recurrent episode, mild  (H) 02/20/2012     Priority: Medium     Chronic maxillary sinusitis 02/15/2012     Priority: Medium     CARDIOVASCULAR SCREENING; LDL GOAL LESS THAN 160 10/31/2010     Priority: Medium     Mild intermittent asthma without complication 12/07/2005     Priority: Medium      Past Medical History:   Diagnosis Date     Abnormal Papanicolaou smear of cervix and cervical HPV      Allergic rhinitis due to animal dander      Allergy to mold spores     4/17/12 RAST pos. for dog/DM/M only     Depressive disorder, not elsewhere classified      Diagnostic skin and sensitization tests 4/17/12 RAST pos. for dog/DM/M only     Esophageal reflux 9/9/2015     Hip pain, right 2/2/2016     House dust mite allergy      Obesity, unspecified      Osteoarthritis of right hip, unspecified osteoarthritis type 2/2/2016     Primary osteoarthritis of left shoulder 1/16/2017     Seasonal allergic rhinitis 2/2/2016     Unspecified asthma(493.90)     Asthma     Past Surgical History:   Procedure Laterality Date     ARTHROPLASTY HIP Right 3/28/2016    Procedure: ARTHROPLASTY HIP;  Surgeon: Juan Espinoza MD;  Location: [facility] OR     C LIGATE FALLOPIAN TUBE       C NONSPECIFIC PROCEDURE      Sinus surgery cyst and septum     C NONSPECIFIC PROCEDURE      Vein closure     HC COLP CERVIX/UPPER VAGINA W LOOP ELEC BX CERVIX  1999     INCISION AND DRAINAGE HIP, COMBINED  2010    Post operative     JOINT REPLACEMENT, HIP RT/LT  2010    Joint Replacement Hip LT     JOINT REPLACEMENT, HIP RT/LT  01/07/13    left total hip arthroplasty     VULVECTOMY RADICAL (NO GROIN DISSECTION)  4/18/2013    Procedure: VULVECTOMY RADICAL (NO GROIN DISSECTION);  Left radical Vulvectomy ;  Surgeon: Gloria Broussard MD;  Location: [facility] OR     Current Outpatient Prescriptions   Medication Sig Dispense Refill     albuterol (PROAIR HFA) 108 (90 Base) MCG/ACT Inhaler INHALE ONE TO TWO PUFFS BY MOUTH FOUR TIMES A DAY AS NEEDED 8.5 g 1     CALCIUM-MAGNESIUM-ZINC PO 1 tablet daily        FLUoxetine (PROZAC) 20 MG capsule Take 1 capsule (20 mg) by mouth daily 90 capsule 1     FLUoxetine (PROZAC) 40 MG capsule TAKE ONE CAPSULE BY MOUTH EVERY DAY WITH 20MG CAPSULE 90 capsule 1     fluticasone (FLONASE) 50 MCG/ACT spray Spray 1-2 sprays into both nostrils daily 1 Bottle 11     methylPREDNISolone (MEDROL DOSEPAK) 4 MG tablet Follow package instructions 21 tablet 0     montelukast (SINGULAIR) 10 MG tablet Take 1 tablet (10 mg) by mouth At Bedtime 90 tablet 1     Multiple Vitamins-Minerals (CENTRUM SILVER PO) Take  by mouth.       omeprazole (PRILOSEC) 40 MG capsule TAKE ONE CAPSULE BY MOUTH EVERY DAY 30-60 MINUTES BEFORE A MEAL --PATIENT REQUESTS [employer] BRAND - Fill when requested 90 capsule 1     ondansetron (ZOFRAN-ODT) 4 MG ODT tab DISSOLVE TWO TABLETS ON TONGUE EVERY 8 HOURS AS NEEDED FOR NAUSEA 60 tablet 0     ZYRTEC 10 MG OR TABS ONE TABLET DAILY 1 month 2     OTC products: calcium and zyrtec    Allergies   Allergen Reactions     Latex Itching     Lisinopril      Cough     Red Dye       Latex Allergy: YES: Precautions to be taken    Social History   Substance Use Topics     Smoking status: Former Smoker     Packs/day: 0.50     Quit date: 1/1/2013     Smokeless tobacco: Never Used      Comment: Quit 3 yrs ago, however smokes up to 5 cigarettes weekly     Alcohol use 0.0 oz/week     0 Standard drinks or equivalent per week      Comment: 2-3  weekly     History   Drug Use No       REVIEW OF SYSTEMS:   CONSTITUTIONAL: NEGATIVE for fever, chills, change in weight  INTEGUMENTARY/SKIN: NEGATIVE for worrisome rashes, moles or lesions  EYES: NEGATIVE for vision changes or irritation  ENT/MOUTH: NEGATIVE for ear, mouth and throat problems  RESP: NEGATIVE for significant cough or SOB  BREAST: NEGATIVE for masses, tenderness or discharge  CV: NEGATIVE for chest pain, palpitations or peripheral edema  GI: NEGATIVE for nausea, abdominal pain, heartburn, or change in bowel habits  : NEGATIVE for frequency,  "dysuria, or hematuria  MUSCULOSKELETAL: NEGATIVE for significant arthralgias or myalgia  NEURO: NEGATIVE for weakness, dizziness or paresthesias  ENDOCRINE: NEGATIVE for temperature intolerance, skin/hair changes  HEME: NEGATIVE for bleeding problems  PSYCHIATRIC: NEGATIVE for changes in mood or affect    EXAM:   /66  Pulse 84  Temp 98.5  F (36.9  C) (Tympanic)  Resp 20  Ht 5' 5\" (1.651 m)  Wt 211 lb (95.7 kg)  LMP 07/20/2010 (Approximate)  SpO2 100%  Breastfeeding? No  BMI 35.11 kg/m2    GENERAL APPEARANCE: healthy, alert, no distress and over weight     EYES: EOMI, PERRL     HENT: ear canals and TM's normal and nose and mouth without ulcers or lesions     NECK: no adenopathy, no asymmetry, masses, or scars and thyroid normal to palpation     RESP: lungs clear to auscultation - no rales, rhonchi or wheezes     CV: regular rates and rhythm, normal S1 S2, no S3 or S4 and no murmur, click or rub     ABDOMEN:  soft, nontender, no HSM or masses and bowel sounds normal     MS: extremities normal- no gross deformities noted, no evidence of inflammation in joints, FROM in all extremities.     SKIN: no suspicious lesions or rashes     NEURO: Normal strength and tone, sensory exam grossly normal, mentation intact and speech normal     PSYCH: mentation appears normal. and affect normal/bright     LYMPHATICS: No cervical adenopathy    DIAGNOSTICS:   EKG: appears normal, NSR, normal axis, normal intervals, no acute ST/T changes c/w ischemia, no LVH by voltage criteria, unchanged from previous tracings    Recent Labs   Lab Test  04/04/18   0903  01/16/17   0926  03/30/16   0555   03/11/16   0934   HGB   --   13.1  9.2*   < >  12.5   PLT   --   229   --    --   215   NA  141  141   --    --    --    POTASSIUM  4.0  3.4   --    --    --    CR  0.61  0.64   --    --    --     < > = values in this interval not displayed.        IMPRESSION:   Reason for surgery/procedure: excessive arm skin     The proposed surgical " procedure is considered INTERMEDIATE risk.    REVISED CARDIAC RISK INDEX  The patient has the following serious cardiovascular risks for perioperative complications such as (MI, PE, VFib and 3  AV Block):  No serious cardiac risks  INTERPRETATION: 0 risks: Class I (very low risk - 0.4% complication rate)    The patient has the following additional risks for perioperative complications:  Morbid obesity      ICD-10-CM    1. Preop general physical exam Z01.818 EKG 12-lead complete w/read - Clinics   2. Excess skin of arm L98.7    3. Morbid obesity (H) E66.01 EKG 12-lead complete w/read - Clinics       RECOMMENDATIONS:       --Patient is to take all scheduled medications on the day of surgery EXCEPT for modifications listed below.    APPROVAL GIVEN to proceed with proposed procedure, without further diagnostic evaluation       Signed Electronically by: CHARLENE Cardoso CNP    Copy of this evaluation report is provided to requesting physician.    [facility] Preop Guidelines    Revised Cardiac Risk Index

## 2018-08-27 NOTE — MR AVS SNAPSHOT
After Visit Summary   8/27/2018    Yamel Martinez    MRN: [id number]           Patient Information     Date Of Birth          1960        Visit Information        Provider Department      8/27/2018 9:40 AM Sheryl Sparks APRN CNP [facility]        Today's Diagnoses     Preop general physical exam    -  1    Excess skin of arm        Morbid obesity (H)          Care Instructions      Don't take any more Ibuprofen, Aleve, Naproxen or Aspirin prior to surgery.  Tylenol and/or prescription pain pills are okay to use if needed.       Before Your Surgery      Call your surgeon if there is any change in your health. This includes signs of a cold or flu (such as a sore throat, runny nose, cough, rash or fever).    Do not smoke, drink alcohol or take over the counter medicine (unless your surgeon or primary care doctor tells you to) for the 24 hours before and after surgery.    If you take prescribed drugs: Follow your doctor s orders about which medicines to take and which to stop until after surgery.    Eating and drinking prior to surgery: follow the instructions from your surgeon    Take a shower or bath the night before surgery. Use the soap your surgeon gave you to gently clean your skin. If you do not have soap from your surgeon, use your regular soap. Do not shave or scrub the surgery site.  Wear clean pajamas and have clean sheets on your bed.           Follow-ups after your visit        Your next 10 appointments already scheduled     Sep 07, 2018   Procedure with JAKE Lechuga MD   [facility] (--)    [address] [place] [address] [phone]              Who to contact     If you have questions or need follow up information about today's clinic visit or your schedule please contact [facility] directly at [phone].  Normal or non-critical lab and imaging results will be communicated to you by MyChart, letter or phone within  "4 business days after the clinic has received the results. If you do not hear from us within 7 days, please contact the clinic through [employer] or phone. If you have a critical or abnormal lab result, we will notify you by phone as soon as possible.  Submit refill requests through [email or web address] or call your pharmacy and they will forward the refill request to us. Please allow 3 business days for your refill to be completed.          Additional Information About Your Visit        Care EveryWhere ID     This is your Care EveryWhere ID. This could be used by other organizations to access your [place] medical records  [id number]        Your Vitals Were     Pulse Temperature Respirations Height Last Period Pulse Oximetry    84 98.5  F (36.9  C) (Tympanic) 20 5' 5\" (1.651 m) 07/20/2010 (Approximate) 100%    Breastfeeding? BMI (Body Mass Index)                No 35.11 kg/m2           Blood Pressure from Last 3 Encounters:   08/27/18 102/66   08/20/18 118/82   07/05/18 112/70    Weight from Last 3 Encounters:   08/27/18 211 lb (95.7 kg)   08/20/18 208 lb 6.4 oz (94.5 kg)   07/06/18 210 lb 6.4 oz (95.4 kg)              We Performed the Following     EKG 12-lead complete w/read - Clinics        Primary Care Provider Office Phone # Fax #    Sheryl Canseco CathiisrealCHARLENE [phone]       [address] [facility] [address]        Equal Access to Services     RCIK SEGURA : Hadii aad ku hadasho Soomaali, waaxda luqadaha, qaybta kaalmada adeegyada, chase swanson . So [facility] [phone].    ATENCIÓN: Si habla español, tiene a solis disposición servicios gratuitos de asistencia lingüística. Ziyad al [phone].    We comply with applicable federal civil rights laws and [place] laws. We do not discriminate on the basis of race, color, national origin, age, disability, sex, sexual orientation, or gender identity.            Thank you!     Thank you for choosing [facility]  for your care. " Our goal is always to provide you with excellent care. Hearing back from our patients is one way we can continue to improve our services. Please take a few minutes to complete the written survey that you may receive in the mail after your visit with us. Thank you!             Your Updated Medication List - Protect others around you: Learn how to safely use, store and throw away your medicines at www.disposemymeds.org.          This list is accurate as of 8/27/18 10:12 AM.  Always use your most recent med list.                   Brand Name Dispense Instructions for use Diagnosis    albuterol 108 (90 Base) MCG/ACT inhaler    PROAIR HFA    8.5 g    INHALE ONE TO TWO PUFFS BY MOUTH FOUR TIMES A DAY AS NEEDED    Mild intermittent asthma without complication       CALCIUM-MAGNESIUM-ZINC PO      1 tablet daily    Mild intermittent asthma       CENTRUM SILVER PO      Take  by mouth.        * FLUoxetine 40 MG capsule    PROzac    90 capsule    TAKE ONE CAPSULE BY MOUTH EVERY DAY WITH 20MG CAPSULE    Major depressive disorder, recurrent episode, mild (H)       * FLUoxetine 20 MG capsule    PROzac    90 capsule    Take 1 capsule (20 mg) by mouth daily    Major depressive disorder, recurrent episode, mild (H)       fluticasone 50 MCG/ACT spray    FLONASE    1 Bottle    Spray 1-2 sprays into both nostrils daily    Acute recurrent maxillary sinusitis       methylPREDNISolone 4 MG tablet    MEDROL DOSEPAK    21 tablet    Follow package instructions    Left foot pain       montelukast 10 MG tablet    SINGULAIR    90 tablet    Take 1 tablet (10 mg) by mouth At Bedtime    House dust mite allergy, Allergic rhinitis due to animal dander, Allergy to mold spores       omeprazole 40 MG capsule    priLOSEC    90 capsule    TAKE ONE CAPSULE BY MOUTH EVERY DAY 30-60 MINUTES BEFORE A MEAL --PATIENT REQUESTS [employer] BRAND - Fill when requested    Gastroesophageal reflux disease without esophagitis       ondansetron 4 MG ODT tab    ZOFRAN-ODT     60 tablet    DISSOLVE TWO TABLETS ON TONGUE EVERY 8 HOURS AS NEEDED FOR NAUSEA    Nausea       ZYRTEC 10 MG tablet   Generic drug:  cetirizine     1 month    ONE TABLET DAILY    Allergic rhinitis, cause unspecified       * Notice:  This list has 2 medication(s) that are the same as other medications prescribed for you. Read the directions carefully, and ask your doctor or other care provider to review them with you.

## 2018-08-27 NOTE — PATIENT INSTRUCTIONS
Don't take any more Ibuprofen, Aleve, Naproxen or Aspirin prior to surgery.  Tylenol and/or prescription pain pills are okay to use if needed.       Before Your Surgery      Call your surgeon if there is any change in your health. This includes signs of a cold or flu (such as a sore throat, runny nose, cough, rash or fever).    Do not smoke, drink alcohol or take over the counter medicine (unless your surgeon or primary care doctor tells you to) for the 24 hours before and after surgery.    If you take prescribed drugs: Follow your doctor s orders about which medicines to take and which to stop until after surgery.    Eating and drinking prior to surgery: follow the instructions from your surgeon    Take a shower or bath the night before surgery. Use the soap your surgeon gave you to gently clean your skin. If you do not have soap from your surgeon, use your regular soap. Do not shave or scrub the surgery site.  Wear clean pajamas and have clean sheets on your bed.

## 2018-09-06 ENCOUNTER — ANESTHESIA EVENT (OUTPATIENT)
Dept: SURGERY | Facility: AMBULATORY SURGERY CENTER | Age: 58
End: 2018-09-06

## 2018-09-07 ENCOUNTER — ANESTHESIA (OUTPATIENT)
Dept: SURGERY | Facility: AMBULATORY SURGERY CENTER | Age: 58
End: 2018-09-07
Payer: COMMERCIAL

## 2018-09-07 ENCOUNTER — HOSPITAL ENCOUNTER (OUTPATIENT)
Facility: AMBULATORY SURGERY CENTER | Age: 58
Discharge: HOME | End: 2018-09-07
Attending: PLASTIC SURGERY | Admitting: PLASTIC SURGERY
Payer: COMMERCIAL

## 2018-09-07 ENCOUNTER — SURGERY (OUTPATIENT)
Age: 58
End: 2018-09-07

## 2018-09-07 VITALS
RESPIRATION RATE: 16 BRPM | DIASTOLIC BLOOD PRESSURE: 88 MMHG | OXYGEN SATURATION: 99 % | TEMPERATURE: 98.8 F | SYSTOLIC BLOOD PRESSURE: 153 MMHG

## 2018-09-07 DIAGNOSIS — L98.7 EXCESS SKIN OF UPPER EXTREMITY: Primary | ICD-10-CM

## 2018-09-07 PROCEDURE — G8907 PT DOC NO EVENTS ON DISCHARG: HCPCS

## 2018-09-07 PROCEDURE — 15836 EXC EXCESSIVE SKIN ARM: CPT | Mod: 50

## 2018-09-07 PROCEDURE — G8916 PT W IV AB GIVEN ON TIME: HCPCS

## 2018-09-07 PROCEDURE — 88302 TISSUE EXAM BY PATHOLOGIST: CPT | Performed by: PLASTIC SURGERY

## 2018-09-07 RX ORDER — KETOROLAC TROMETHAMINE 30 MG/ML
30 INJECTION, SOLUTION INTRAMUSCULAR; INTRAVENOUS EVERY 6 HOURS PRN
Status: DISCONTINUED | OUTPATIENT
Start: 2018-09-07 | End: 2018-09-08 | Stop reason: HOSPADM

## 2018-09-07 RX ORDER — ONDANSETRON 2 MG/ML
4 INJECTION INTRAMUSCULAR; INTRAVENOUS EVERY 30 MIN PRN
Status: DISCONTINUED | OUTPATIENT
Start: 2018-09-07 | End: 2018-09-08 | Stop reason: HOSPADM

## 2018-09-07 RX ORDER — HYDROMORPHONE HYDROCHLORIDE 1 MG/ML
.3-.5 INJECTION, SOLUTION INTRAMUSCULAR; INTRAVENOUS; SUBCUTANEOUS EVERY 10 MIN PRN
Status: DISCONTINUED | OUTPATIENT
Start: 2018-09-07 | End: 2018-09-08 | Stop reason: HOSPADM

## 2018-09-07 RX ORDER — BUPIVACAINE HYDROCHLORIDE 2.5 MG/ML
INJECTION, SOLUTION INFILTRATION; PERINEURAL PRN
Status: DISCONTINUED | OUTPATIENT
Start: 2018-09-07 | End: 2018-09-07 | Stop reason: HOSPADM

## 2018-09-07 RX ORDER — OXYCODONE HYDROCHLORIDE 5 MG/1
5-10 TABLET ORAL EVERY 6 HOURS PRN
Qty: 30 TABLET | Refills: 0 | Status: SHIPPED | OUTPATIENT
Start: 2018-09-07

## 2018-09-07 RX ORDER — ONDANSETRON 4 MG/1
4 TABLET, ORALLY DISINTEGRATING ORAL EVERY 6 HOURS PRN
Qty: 8 TABLET | Refills: 0 | Status: SHIPPED | OUTPATIENT
Start: 2018-09-07

## 2018-09-07 RX ORDER — AMOXICILLIN 250 MG
1-2 CAPSULE ORAL 2 TIMES DAILY
Qty: 30 TABLET | Refills: 0 | Status: SHIPPED | OUTPATIENT
Start: 2018-09-07

## 2018-09-07 RX ORDER — FENTANYL CITRATE 50 UG/ML
INJECTION, SOLUTION INTRAMUSCULAR; INTRAVENOUS PRN
Status: DISCONTINUED | OUTPATIENT
Start: 2018-09-07 | End: 2018-09-07

## 2018-09-07 RX ORDER — MEPERIDINE HYDROCHLORIDE 25 MG/ML
12.5 INJECTION INTRAMUSCULAR; INTRAVENOUS; SUBCUTANEOUS
Status: DISCONTINUED | OUTPATIENT
Start: 2018-09-07 | End: 2018-09-08 | Stop reason: HOSPADM

## 2018-09-07 RX ORDER — CEFAZOLIN SODIUM 2 G/100ML
2 INJECTION, SOLUTION INTRAVENOUS
Status: COMPLETED | OUTPATIENT
Start: 2018-09-07 | End: 2018-09-07

## 2018-09-07 RX ORDER — LIDOCAINE HYDROCHLORIDE 20 MG/ML
INJECTION, SOLUTION INFILTRATION; PERINEURAL PRN
Status: DISCONTINUED | OUTPATIENT
Start: 2018-09-07 | End: 2018-09-07

## 2018-09-07 RX ORDER — ACETAMINOPHEN 325 MG/1
975 TABLET ORAL ONCE
Status: COMPLETED | OUTPATIENT
Start: 2018-09-07 | End: 2018-09-07

## 2018-09-07 RX ORDER — ALBUTEROL SULFATE 0.83 MG/ML
2.5 SOLUTION RESPIRATORY (INHALATION) EVERY 4 HOURS PRN
Status: DISCONTINUED | OUTPATIENT
Start: 2018-09-07 | End: 2018-09-08 | Stop reason: HOSPADM

## 2018-09-07 RX ORDER — CITRIC ACID/SODIUM CITRATE 334-500MG
30 SOLUTION, ORAL ORAL
Status: COMPLETED | OUTPATIENT
Start: 2018-09-07 | End: 2018-09-07

## 2018-09-07 RX ORDER — PROPOFOL 10 MG/ML
INJECTION, EMULSION INTRAVENOUS CONTINUOUS PRN
Status: DISCONTINUED | OUTPATIENT
Start: 2018-09-07 | End: 2018-09-07

## 2018-09-07 RX ORDER — ONDANSETRON 4 MG/1
4 TABLET, ORALLY DISINTEGRATING ORAL EVERY 30 MIN PRN
Status: DISCONTINUED | OUTPATIENT
Start: 2018-09-07 | End: 2018-09-08 | Stop reason: HOSPADM

## 2018-09-07 RX ORDER — SODIUM CHLORIDE, SODIUM LACTATE, POTASSIUM CHLORIDE, CALCIUM CHLORIDE 600; 310; 30; 20 MG/100ML; MG/100ML; MG/100ML; MG/100ML
INJECTION, SOLUTION INTRAVENOUS CONTINUOUS
Status: DISCONTINUED | OUTPATIENT
Start: 2018-09-07 | End: 2018-09-08 | Stop reason: HOSPADM

## 2018-09-07 RX ORDER — LIDOCAINE 40 MG/G
CREAM TOPICAL
Status: DISCONTINUED | OUTPATIENT
Start: 2018-09-07 | End: 2018-09-08 | Stop reason: HOSPADM

## 2018-09-07 RX ORDER — FENTANYL CITRATE 50 UG/ML
25-50 INJECTION, SOLUTION INTRAMUSCULAR; INTRAVENOUS
Status: DISCONTINUED | OUTPATIENT
Start: 2018-09-07 | End: 2018-09-08 | Stop reason: HOSPADM

## 2018-09-07 RX ORDER — CEFAZOLIN SODIUM 1 G/3ML
1 INJECTION, POWDER, FOR SOLUTION INTRAMUSCULAR; INTRAVENOUS SEE ADMIN INSTRUCTIONS
Status: DISCONTINUED | OUTPATIENT
Start: 2018-09-07 | End: 2018-09-08 | Stop reason: HOSPADM

## 2018-09-07 RX ORDER — NALOXONE HYDROCHLORIDE 0.4 MG/ML
.1-.4 INJECTION, SOLUTION INTRAMUSCULAR; INTRAVENOUS; SUBCUTANEOUS
Status: DISCONTINUED | OUTPATIENT
Start: 2018-09-07 | End: 2018-09-08 | Stop reason: HOSPADM

## 2018-09-07 RX ORDER — OXYCODONE HYDROCHLORIDE 5 MG/1
5-10 TABLET ORAL EVERY 4 HOURS PRN
Status: DISCONTINUED | OUTPATIENT
Start: 2018-09-07 | End: 2018-09-08 | Stop reason: HOSPADM

## 2018-09-07 RX ORDER — DEXAMETHASONE SODIUM PHOSPHATE 4 MG/ML
4 INJECTION, SOLUTION INTRA-ARTICULAR; INTRALESIONAL; INTRAMUSCULAR; INTRAVENOUS; SOFT TISSUE EVERY 10 MIN PRN
Status: DISCONTINUED | OUTPATIENT
Start: 2018-09-07 | End: 2018-09-08 | Stop reason: HOSPADM

## 2018-09-07 RX ORDER — DEXAMETHASONE SODIUM PHOSPHATE 10 MG/ML
INJECTION, SOLUTION INTRAMUSCULAR; INTRAVENOUS PRN
Status: DISCONTINUED | OUTPATIENT
Start: 2018-09-07 | End: 2018-09-07

## 2018-09-07 RX ORDER — PROPOFOL 10 MG/ML
INJECTION, EMULSION INTRAVENOUS PRN
Status: DISCONTINUED | OUTPATIENT
Start: 2018-09-07 | End: 2018-09-07

## 2018-09-07 RX ORDER — LABETALOL HYDROCHLORIDE 5 MG/ML
INJECTION, SOLUTION INTRAVENOUS PRN
Status: DISCONTINUED | OUTPATIENT
Start: 2018-09-07 | End: 2018-09-07

## 2018-09-07 RX ADMIN — OXYCODONE HYDROCHLORIDE 5 MG: 5 TABLET ORAL at 14:12

## 2018-09-07 RX ADMIN — SODIUM CHLORIDE, SODIUM LACTATE, POTASSIUM CHLORIDE, CALCIUM CHLORIDE: 600; 310; 30; 20 INJECTION, SOLUTION INTRAVENOUS at 10:02

## 2018-09-07 RX ADMIN — LABETALOL HYDROCHLORIDE 10 MG: 5 INJECTION, SOLUTION INTRAVENOUS at 13:08

## 2018-09-07 RX ADMIN — DEXAMETHASONE SODIUM PHOSPHATE 10 MG: 10 INJECTION, SOLUTION INTRAMUSCULAR; INTRAVENOUS at 10:11

## 2018-09-07 RX ADMIN — LIDOCAINE HYDROCHLORIDE 60 MG: 20 INJECTION, SOLUTION INFILTRATION; PERINEURAL at 10:05

## 2018-09-07 RX ADMIN — Medication 0.5 MG: at 11:40

## 2018-09-07 RX ADMIN — PROPOFOL 50 MG: 10 INJECTION, EMULSION INTRAVENOUS at 10:07

## 2018-09-07 RX ADMIN — ACETAMINOPHEN 975 MG: 325 TABLET ORAL at 09:15

## 2018-09-07 RX ADMIN — PROPOFOL 200 MG: 10 INJECTION, EMULSION INTRAVENOUS at 10:05

## 2018-09-07 RX ADMIN — Medication 30 ML: at 09:37

## 2018-09-07 RX ADMIN — FENTANYL CITRATE 25 MCG: 50 INJECTION, SOLUTION INTRAMUSCULAR; INTRAVENOUS at 10:05

## 2018-09-07 RX ADMIN — FENTANYL CITRATE 25 MCG: 50 INJECTION, SOLUTION INTRAMUSCULAR; INTRAVENOUS at 11:00

## 2018-09-07 RX ADMIN — Medication 0.5 MG: at 13:11

## 2018-09-07 RX ADMIN — FENTANYL CITRATE 25 MCG: 50 INJECTION, SOLUTION INTRAMUSCULAR; INTRAVENOUS at 10:42

## 2018-09-07 RX ADMIN — CEFAZOLIN SODIUM 1 G: 2 INJECTION, SOLUTION INTRAVENOUS at 12:20

## 2018-09-07 RX ADMIN — BUPIVACAINE HYDROCHLORIDE 50 ML: 2.5 INJECTION, SOLUTION INFILTRATION; PERINEURAL at 12:50

## 2018-09-07 RX ADMIN — LABETALOL HYDROCHLORIDE 5 MG: 5 INJECTION, SOLUTION INTRAVENOUS at 12:30

## 2018-09-07 RX ADMIN — PROPOFOL 50 MG: 10 INJECTION, EMULSION INTRAVENOUS at 10:40

## 2018-09-07 RX ADMIN — BUPIVACAINE HYDROCHLORIDE 50 ML: 2.5 INJECTION, SOLUTION INFILTRATION; PERINEURAL at 12:46

## 2018-09-07 RX ADMIN — LABETALOL HYDROCHLORIDE 5 MG: 5 INJECTION, SOLUTION INTRAVENOUS at 11:42

## 2018-09-07 RX ADMIN — PROPOFOL 200 MCG/KG/MIN: 10 INJECTION, EMULSION INTRAVENOUS at 10:07

## 2018-09-07 RX ADMIN — LABETALOL HYDROCHLORIDE 10 MG: 5 INJECTION, SOLUTION INTRAVENOUS at 12:36

## 2018-09-07 RX ADMIN — FENTANYL CITRATE 25 MCG: 50 INJECTION, SOLUTION INTRAMUSCULAR; INTRAVENOUS at 10:39

## 2018-09-07 RX ADMIN — LABETALOL HYDROCHLORIDE 10 MG: 5 INJECTION, SOLUTION INTRAVENOUS at 12:45

## 2018-09-07 RX ADMIN — CEFAZOLIN SODIUM 2 G: 2 INJECTION, SOLUTION INTRAVENOUS at 10:20

## 2018-09-07 ASSESSMENT — LIFESTYLE VARIABLES: TOBACCO_USE: 1

## 2018-09-07 NOTE — BRIEF OP NOTE
[facility]    Brief Operative Note    Pre-operative diagnosis: Excess arm skin  Post-operative diagnosis * No post-op diagnosis entered *  Procedure: Procedure(s):  Bilateral brachioplasty - Wound Class: I-Clean  Surgeon: Surgeon(s) and Role:     * JAKE Lechuga MD - Primary  Anesthesia: General   Estimated blood loss: 50cc  Drains: None  Specimens:   ID Type Source Tests Collected by Time Destination   A : Left arm skin and fat Tissue Arm, Left SURGICAL PATHOLOGY EXAM JAKE Lechuga MD 9/7/2018 12:21 PM    B : Right arm skin and fat Tissue Arm, Right SURGICAL PATHOLOGY EXAM JAKE Lechuga MD 9/7/2018 12:21 PM      Findings:   Bilateral brachioplasty.  Complications: None.  Implants: None.

## 2018-09-07 NOTE — ANESTHESIA PREPROCEDURE EVALUATION
Anesthesia Evaluation     . Pt has had prior anesthetic. Type: General (Wakes up crabby,  Needs inhaler)    History of anesthetic complications          ROS/MED HX    ENT/Pulmonary:     (+)tobacco use, Past use Quit 3 to 4 years ago packs/day  Intermittent asthma Treatment: Inhaler daily,  , . .    Neurologic:  - neg neurologic ROS     Cardiovascular:     (+) hypertension----. : . . . :. .       METS/Exercise Tolerance:     Hematologic:  - neg hematologic  ROS       Musculoskeletal:  - neg musculoskeletal ROS       GI/Hepatic: Comment: S/P gastric sleeve.  Improvement in reflux after weight loss    (+) GERD Asymptomatic on medication,       Renal/Genitourinary:  - ROS Renal section negative       Endo:  - neg endo ROS       Psychiatric:     (+) psychiatric history depression      Infectious Disease:  - neg infectious disease ROS       Malignancy:      - no malignancy   Other:    - neg other ROS                 Physical Exam  Normal systems: cardiovascular and dental    Airway   Mallampati: I  TM distance: >3 FB  Neck ROM: full    Dental     Cardiovascular       Pulmonary (+) wheezes     PE comment: A few wheezes present.  Chronic.                    Anesthesia Plan      History & Physical Review  History and physical reviewed and following examination; no interval change.    ASA Status:  3 .    NPO Status:  > 8 hours    Plan for General and LMA with Intravenous and Propofol induction. Maintenance will be TIVA.    PONV prophylaxis:  Ondansetron (or other 5HT-3) and Dexamethasone or Solumedrol  Bicitra pre-op because she forgot her prilosec.  Inhaler pre-op. LMA.      Postoperative Care  Postoperative pain management:  Multi-modal analgesia.      Consents  Anesthetic plan, risks, benefits and alternatives discussed with:  Patient..                          .

## 2018-09-07 NOTE — OR NURSING
Pt arrived back in PACU stating she needed to go to the BR- attempted bed pan- no success.  Assisted pt to the bathroom and she was able to void.  Pt then allowed to sit in recliner.  Pts vs stable- bp taken on ankle- now 153/88.  Pt on room air. Doing well.

## 2018-09-07 NOTE — OR NURSING
Pt declined offer to have  and mother come into PreOp room. He thought it might make her more nervous.  Pt voiced anxiety about PIV start. Is a difficult IV start requiring multiple attempts in the past. Arms wrapped in warm blankets. PIV started by Faby Oliveira RN successfully with one IV stick. Tolerated well. Pt with hx GERD, received Bicitra in PreOp. Also with hx asthma, has Albuterol inhaler in purse. Dr. Palma requested that pt use her  inhaler in PreOp: Pt took 2 puffs in PreOp at 0955. Inhaler returned to purse. Pt took off her wedding ring and it is an a labeled bag in her purse- per her request.

## 2018-09-07 NOTE — DISCHARGE INSTRUCTIONS
ARM LIFT POST-OPERATIVE INSTRUCTIONS    Instructions   Depending on the complexity of your surgery, you may need to be    admitted to the hospital for a few days.    Have someone drive you home after surgery and help you at home for week.    Get plenty of rest.    Follow balanced diet.    Decreased activity may promote constipation, so you may want to add    more raw fruit to your diet, and be sure to increase fluid intake.    Take pain medication as prescribed. Do not take aspirin or any products     containing aspirin.    Do not drink alcohol when taking pain medications.    Even when not taking pain medications, no alcohol for 3 weeks as it     causes fluid retention.    Do not smoke, as smoking delays healing and increases the risk of  complications.    If you are provided with an arm wrap, wear it as instructed.     Activities      Start walking as soon as possible, as this helps to reduce swelling and     lowers the chance of blood clots. Keep arms elevated as much as possible   above the level of the heart.    Do not drive until you are no longer taking any pain medications    (narcotics).     Do not drive until you have full range of motion in your arms and no    discomfort in your arms.    No lifting greater than 5-10 lbs. for 4-6 weeks.    Resume sexual activity as comfort permits, usually 2-3 weeks     postoperatively.    Strenuous exercise and activities are restricted for 6 weeks.    Return to work in 3- 6 weeks as directed by your surgeon.    Incision Care    Your surgeon may use staples or sutures to close your incision. You may also     have drains inserted following your surgery (not in your case).    You may shower 48 hours after surgery if no drains used, or 48 hours after drains removed if used.     Avoid exposing scars to sun for at least 12 months.    Always use a strong sunblock, if sun exposure is unavoidable (SPF 50 or    greater).    Keep the tape/glue on and allow it to peel off over  time.    Keep incisions clean and inspect daily for signs of infection.    Start moisturizing your abdomen and scar by day 10 after surgery.    No tub soaking while sutures or drains are in place.    Use the arm wraps for 4-6 weeks, re-wrap them as needed starting from the hands to the upper arms, firm and not too tightly; may pad incision with     dressings for comfort.    No tub soaking, bathing, or swimming while sutures or drains are in place.    What to Expect    You may experience temporary pain, soreness, numbness of arm/chest     skin, incision discomfort.Your hand may feel numb for a few hours after surgery due to the numbing medicine used in the surgery.    Maximum discomfort will occur the first few days.    You will have bruising and swelling of the arms. The majority of     bruising and swelling will subside in 6-8 weeks.    You may feel tired for several weeks or months.    One or more of your drains may remain in for several weeks.    Appearance      You may notice swelling and bruising of the arm/chest area for a week or so.    Scars will be reddened for 6 months. After that, they will fade and soften.    The scar will extend from the elbow to the armpit and side of your chest.     Follow-Up Care    If used, drains removed when less than 30 ml for 24 hours.    Usually, absorbable stitches are used. Surface staples (if used) removed in 2       weeks but may remain in longer if there is concern of incision separation.    Please note my office will call you 1-2 business days after your procedure to check up on how you're doing and to schedule your post-operative appointment.     When to Call    If you have increased swelling or bruising despite loosening of your warps. Un-wrap and re-wrap if the wrap feels too tight.    If swelling and redness persist after a few days.    If you have increased redness along the incision. If you have severe or                increased pain not relieved by medication.     If you have any side effects to medications; such as, rash, nausea,    headache, vomiting.    If you have an oral temperature over 100.4 degrees.    If you have any yellowish or greenish drainage from the incisions or    notice a foul odor.      If you have bleeding from the incisions that is difficult to control with light              pressure.    If you have loss of feeling or motion.     For Medical Questions, Please Call:    [phone], Monday-Friday, 8 a.m.- 4:30 pm     After hours and on weekends, call Hospital Paging at [phone] and ask        for the Plastic Surgeon on call.    [facility]  Same-Day Surgery   Adult Discharge Orders & Instructions   For 24 hours after surgery  1. Get plenty of rest.  A responsible adult must stay with you for at least 24 hours after you leave the hospital.   2. Do not drive or use heavy equipment.  If you have weakness or tingling, don't drive or use heavy equipment until this feeling goes away.  3. Do not drink alcohol.  4. Avoid strenuous or risky activities.  Ask for help when climbing stairs.   5. You may feel lightheaded.  IF so, sit for a few minutes before standing.  Have someone help you get up.   6. If you have nausea (feel sick to your stomach): Drink only clear liquids such as apple juice, ginger ale, broth or 7-Up.  Rest may also help.  Be sure to drink enough fluids.  Move to a regular diet as you feel able.  7. You may have a slight fever. Call the doctor if your fever is over 100 F (37.7 C) (taken under the tongue) or lasts longer than 24 hours.  8. You may have a dry mouth, a sore throat, muscle aches or trouble sleeping.  These should go away after 24 hours.  9. Do not make important or legal decisions.   Call your doctor for any of the followin.  Signs of infection (fever, growing tenderness at the surgery site, a large amount of drainage or bleeding, severe pain, foul-smelling drainage, redness, swelling).    2. It has  been over 8 to 10 hours since surgery and you are still not able to urinate (pass water).    3.  Headache for over 24 hours.

## 2018-09-07 NOTE — IP AVS SNAPSHOT
MRN:[id number]                      After Visit Summary   9/7/2018    Yamel Martinez    MRN: [id number]           Thank you!     Thank you for choosing [facility] for your care. Our goal is always to provide you with excellent care. Hearing back from our patients is one way we can continue to improve our services. Please take a few minutes to complete the written survey that you may receive in the mail after you visit with us. Thank you!        Patient Information     Date Of Birth          1960        About your hospital stay     You were admitted on:  September 7, 2018 You last received care in the:  [facility] [place] [facility]    You were discharged on:  September 7, 2018       Who to Call     For medical emergencies, please call 911.  For non-urgent questions about your medical care, please call your primary care provider or clinic, [phone]  For questions related to your surgery, please call your surgery clinic        Attending Provider     Provider JAKE Naqvi MD Plastic Surgery       Primary Care Provider Office Phone # Fax #    Sheryl CHARLENE Metz [phone]      Further instructions from your care team       ARM LIFT POST-OPERATIVE INSTRUCTIONS    Instructions   Depending on the complexity of your surgery, you may need to be    admitted to the hospital for a few days.    Have someone drive you home after surgery and help you at home for week.    Get plenty of rest.    Follow balanced diet.    Decreased activity may promote constipation, so you may want to add    more raw fruit to your diet, and be sure to increase fluid intake.    Take pain medication as prescribed. Do not take aspirin or any products     containing aspirin.    Do not drink alcohol when taking pain medications.    Even when not taking pain medications, no alcohol for 3 weeks as it     causes fluid retention.    Do not smoke, as smoking delays healing and increases the  risk of  complications.    If you are provided with an arm wrap, wear it as instructed.     Activities      Start walking as soon as possible, as this helps to reduce swelling and     lowers the chance of blood clots. Keep arms elevated as much as possible   above the level of the heart.    Do not drive until you are no longer taking any pain medications    (narcotics).     Do not drive until you have full range of motion in your arms and no    discomfort in your arms.    No lifting greater than 5-10 lbs. for 4-6 weeks.    Resume sexual activity as comfort permits, usually 2-3 weeks     postoperatively.    Strenuous exercise and activities are restricted for 6 weeks.    Return to work in 3- 6 weeks as directed by your surgeon.    Incision Care    Your surgeon may use staples or sutures to close your incision. You may also     have drains inserted following your surgery (not in your case).    You may shower 48 hours after surgery if no drains used, or 48 hours after drains removed if used.     Avoid exposing scars to sun for at least 12 months.    Always use a strong sunblock, if sun exposure is unavoidable (SPF 50 or    greater).    Keep the tape/glue on and allow it to peel off over time.    Keep incisions clean and inspect daily for signs of infection.    Start moisturizing your abdomen and scar by day 10 after surgery.    No tub soaking while sutures or drains are in place.    Use the arm wraps for 4-6 weeks, re-wrap them as needed starting from the hands to the upper arms, firm and not too tightly; may pad incision with     dressings for comfort.    No tub soaking, bathing, or swimming while sutures or drains are in place.    What to Expect    You may experience temporary pain, soreness, numbness of arm/chest     skin, incision discomfort.Your hand may feel numb for a few hours after surgery due to the numbing medicine used in the surgery.    Maximum discomfort will occur the first few days.    You will have  bruising and swelling of the arms. The majority of     bruising and swelling will subside in 6-8 weeks.    You may feel tired for several weeks or months.    One or more of your drains may remain in for several weeks.    Appearance      You may notice swelling and bruising of the arm/chest area for a week or so.    Scars will be reddened for 6 months. After that, they will fade and soften.    The scar will extend from the elbow to the armpit and side of your chest.     Follow-Up Care    If used, drains removed when less than 30 ml for 24 hours.    Usually, absorbable stitches are used. Surface staples (if used) removed in 2       weeks but may remain in longer if there is concern of incision separation.    Please note my office will call you 1-2 business days after your procedure to check up on how you're doing and to schedule your post-operative appointment.     When to Call    If you have increased swelling or bruising despite loosening of your warps. Un-wrap and re-wrap if the wrap feels too tight.    If swelling and redness persist after a few days.    If you have increased redness along the incision. If you have severe or                increased pain not relieved by medication.    If you have any side effects to medications; such as, rash, nausea,    headache, vomiting.    If you have an oral temperature over 100.4 degrees.    If you have any yellowish or greenish drainage from the incisions or    notice a foul odor.      If you have bleeding from the incisions that is difficult to control with light              pressure.    If you have loss of feeling or motion.     For Medical Questions, Please Call:    [phone], Monday-Friday, 8 a.m.- 4:30 pm     After hours and on weekends, call Hospital Paging at [phone] and ask        for the Plastic Surgeon on call.    [facility]  Same-Day Surgery   Adult Discharge Orders & Instructions   For 24 hours after surgery  1. Get plenty of  rest.  A responsible adult must stay with you for at least 24 hours after you leave the hospital.   2. Do not drive or use heavy equipment.  If you have weakness or tingling, don't drive or use heavy equipment until this feeling goes away.  3. Do not drink alcohol.  4. Avoid strenuous or risky activities.  Ask for help when climbing stairs.   5. You may feel lightheaded.  IF so, sit for a few minutes before standing.  Have someone help you get up.   6. If you have nausea (feel sick to your stomach): Drink only clear liquids such as apple juice, ginger ale, broth or 7-Up.  Rest may also help.  Be sure to drink enough fluids.  Move to a regular diet as you feel able.  7. You may have a slight fever. Call the doctor if your fever is over 100 F (37.7 C) (taken under the tongue) or lasts longer than 24 hours.  8. You may have a dry mouth, a sore throat, muscle aches or trouble sleeping.  These should go away after 24 hours.  9. Do not make important or legal decisions.   Call your doctor for any of the followin.  Signs of infection (fever, growing tenderness at the surgery site, a large amount of drainage or bleeding, severe pain, foul-smelling drainage, redness, swelling).    2. It has been over 8 to 10 hours since surgery and you are still not able to urinate (pass water).    3.  Headache for over 24 hours.          Pending Results     No orders found from 2018 to 2018.            Admission Information     Date & Time Provider Department Dept. Phone    2018 JAKE Lechuga MD [facility] [place] [phone]      Your Vitals Were     Blood Pressure Temperature Respirations Last Period Pulse Oximetry       135/72 98.7  F (37.1  C) (Temporal) 18 2010 (Approximate) 98%       Care EveryWhere ID     This is your Care EveryWhere ID. This could be used by other organizations to access your [facility] medical records  [id number]        Equal Access to Services     RICK SEGURA AH: Jeremiah gonzalez  marilyn Perez, waaxda luqadaha, qaybta kaalmada edgard, chase ivelissein hayaan leidykaleb cavazos latreasurevenus ross. So [facility] [phone].    ATENCIÓN: Si dejala lisa, tiene a solis disposición servicios gratuitos de asistencia lingüística. Llame al [phone].    We comply with applicable federal civil rights laws and [place] laws. We do not discriminate on the basis of race, color, national origin, age, disability, sex, sexual orientation, or gender identity.               Review of your medicines      START taking        Dose / Directions    oxyCODONE IR 5 MG tablet   Commonly known as:  ROXICODONE   Used for:  Excess skin of upper extremity        Dose:  5-10 mg   Take 1-2 tablets (5-10 mg) by mouth every 6 hours as needed for other (Moderate to Severe Pain)   Quantity:  30 tablet   Refills:  0       senna-docusate 8.6-50 MG per tablet   Commonly known as:  SENOKOT-S;PERICOLACE   Used for:  Excess skin of upper extremity        Dose:  1-2 tablet   Take 1-2 tablets by mouth 2 times daily   Quantity:  30 tablet   Refills:  0         CONTINUE these medicines which may have CHANGED, or have new prescriptions. If we are uncertain of the size of tablets/capsules you have at home, strength may be listed as something that might have changed.        Dose / Directions    * ondansetron 4 MG ODT tab   Commonly known as:  ZOFRAN-ODT   This may have changed:  Another medication with the same name was added. Make sure you understand how and when to take each.   Used for:  Nausea        DISSOLVE TWO TABLETS ON TONGUE EVERY 8 HOURS AS NEEDED FOR NAUSEA   Quantity:  60 tablet   Refills:  0       * ondansetron 4 MG ODT tab   Commonly known as:  ZOFRAN-ODT   This may have changed:  You were already taking a medication with the same name, and this prescription was added. Make sure you understand how and when to take each.   Used for:  Excess skin of upper extremity        Dose:  4 mg   Take 1 tablet (4 mg) by mouth every 6 hours as needed  for nausea   Quantity:  8 tablet   Refills:  0       * Notice:  This list has 2 medication(s) that are the same as other medications prescribed for you. Read the directions carefully, and ask your doctor or other care provider to review them with you.      CONTINUE these medicines which have NOT CHANGED        Dose / Directions    albuterol 108 (90 Base) MCG/ACT inhaler   Commonly known as:  PROAIR HFA   Used for:  Mild intermittent asthma without complication        INHALE ONE TO TWO PUFFS BY MOUTH FOUR TIMES A DAY AS NEEDED   Quantity:  8.5 g   Refills:  1       CALCIUM-MAGNESIUM-ZINC PO   Used for:  Mild intermittent asthma        1 tablet daily   Refills:  0       CENTRUM SILVER PO        Take  by mouth.   Refills:  0       * FLUoxetine 40 MG capsule   Commonly known as:  PROzac   Used for:  Major depressive disorder, recurrent episode, mild (H)        TAKE ONE CAPSULE BY MOUTH EVERY DAY WITH 20MG CAPSULE   Quantity:  90 capsule   Refills:  1       * FLUoxetine 20 MG capsule   Commonly known as:  PROzac   Used for:  Major depressive disorder, recurrent episode, mild (H)        Dose:  20 mg   Take 1 capsule (20 mg) by mouth daily   Quantity:  90 capsule   Refills:  1       fluticasone 50 MCG/ACT spray   Commonly known as:  FLONASE   Used for:  Acute recurrent maxillary sinusitis        Dose:  1-2 spray   Spray 1-2 sprays into both nostrils daily   Quantity:  1 Bottle   Refills:  11       montelukast 10 MG tablet   Commonly known as:  SINGULAIR   Used for:  House dust mite allergy, Allergic rhinitis due to animal dander, Allergy to mold spores        Dose:  10 mg   Take 1 tablet (10 mg) by mouth At Bedtime   Quantity:  90 tablet   Refills:  1       omeprazole 40 MG capsule   Commonly known as:  priLOSEC   Used for:  Gastroesophageal reflux disease without esophagitis        TAKE ONE CAPSULE BY MOUTH EVERY DAY 30-60 MINUTES BEFORE A MEAL --PATIENT REQUESTS SANDOZ BRAND - Fill when requested   Quantity:  90 capsule    Refills:  1       ZYRTEC 10 MG tablet   Used for:  Allergic rhinitis, cause unspecified   Generic drug:  cetirizine        ONE TABLET DAILY   Quantity:  1 month   Refills:  2       * Notice:  This list has 2 medication(s) that are the same as other medications prescribed for you. Read the directions carefully, and ask your doctor or other care provider to review them with you.         Where to get your medicines      These medications were sent to [facility] - [place], [place] - [address], [address]  [address], [address], [facility] [address]     Phone:  [phone]     ondansetron 4 MG ODT tab    senna-docusate 8.6-50 MG per tablet         Some of these will need a paper prescription and others can be bought over the counter. Ask your nurse if you have questions.     Bring a paper prescription for each of these medications     oxyCODONE IR 5 MG tablet                Protect others around you: Learn how to safely use, store and throw away your medicines at www.disposemymeds.org.        Information about OPIOIDS     PRESCRIPTION OPIOIDS: WHAT YOU NEED TO KNOW   We gave you an opioid (narcotic) pain medicine. It is important to manage your pain, but opioids are not always the best choice. You should first try all the other options your care team gave you. Take this medicine for as short a time (and as few doses) as possible.    Some activities can increase your pain, such as bandage changes or therapy sessions. It may help to take your pain medicine 30 to 60 minutes before these activities. Reduce your stress by getting enough sleep, working on hobbies you enjoy and practicing relaxation or meditation. Talk to your care team about ways to manage your pain beyond prescription opioids.    These medicines have risks:    DO NOT drive when on new or higher doses of pain medicine. These medicines can affect your alertness and reaction times, and you could be arrested for driving under  the influence (DUI). If you need to use opioids long-term, talk to your care team about driving.    DO NOT operate heavy machinery    DO NOT do any other dangerous activities while taking these medicines.    DO NOT drink any alcohol while taking these medicines.     If the opioid prescribed includes acetaminophen, DO NOT take with any other medicines that contain acetaminophen. Read all labels carefully. Look for the word  acetaminophen  or  Tylenol.  Ask your pharmacist if you have questions or are unsure.    You can get addicted to pain medicines, especially if you have a history of addiction (chemical, alcohol or substance dependence). Talk to your care team about ways to reduce this risk.    All opioids tend to cause constipation. Drink plenty of water and eat foods that have a lot of fiber, such as fruits, vegetables, prune juice, apple juice and high-fiber cereal. Take a laxative (Miralax, milk of magnesia, Colace, Senna) if you don t move your bowels at least every other day. Other side effects include upset stomach, sleepiness, dizziness, throwing up, tolerance (needing more of the medicine to have the same effect), physical dependence and slowed breathing.    Store your pills in a secure place, locked if possible. We will not replace any lost or stolen medicine. If you don t finish your medicine, please throw away (dispose) as directed by your pharmacist. The [place] [employer] has more information about safe disposal: [email or web address]://[email or web address].[email or web address]/living-green/managing-[email or web address]-medications             Medication List: This is a list of all your medications and when to take them. Check marks below indicate your daily home schedule. Keep this list as a reference.      Medications           Morning Afternoon Evening Bedtime As Needed    albuterol 108 (90 Base) MCG/ACT inhaler   Commonly known as:  PROAIR HFA   INHALE ONE TO TWO PUFFS BY MOUTH FOUR TIMES A DAY AS NEEDED                                 CALCIUM-MAGNESIUM-ZINC PO   1 tablet daily                                CENTRUM SILVER PO   Take  by mouth.                                * FLUoxetine 40 MG capsule   Commonly known as:  PROzac   TAKE ONE CAPSULE BY MOUTH EVERY DAY WITH 20MG CAPSULE                                * FLUoxetine 20 MG capsule   Commonly known as:  PROzac   Take 1 capsule (20 mg) by mouth daily                                fluticasone 50 MCG/ACT spray   Commonly known as:  FLONASE   Spray 1-2 sprays into both nostrils daily                                montelukast 10 MG tablet   Commonly known as:  SINGULAIR   Take 1 tablet (10 mg) by mouth At Bedtime                                omeprazole 40 MG capsule   Commonly known as:  priLOSEC   TAKE ONE CAPSULE BY MOUTH EVERY DAY 30-60 MINUTES BEFORE A MEAL --PATIENT REQUESTS SANDOZ BRAND - Fill when requested                                * ondansetron 4 MG ODT tab   Commonly known as:  ZOFRAN-ODT   DISSOLVE TWO TABLETS ON TONGUE EVERY 8 HOURS AS NEEDED FOR NAUSEA                                * ondansetron 4 MG ODT tab   Commonly known as:  ZOFRAN-ODT   Take 1 tablet (4 mg) by mouth every 6 hours as needed for nausea                                oxyCODONE IR 5 MG tablet   Commonly known as:  ROXICODONE   Take 1-2 tablets (5-10 mg) by mouth every 6 hours as needed for other (Moderate to Severe Pain)                                senna-docusate 8.6-50 MG per tablet   Commonly known as:  SENOKOT-S;PERICOLACE   Take 1-2 tablets by mouth 2 times daily                                ZYRTEC 10 MG tablet   ONE TABLET DAILY   Generic drug:  cetirizine                                * Notice:  This list has 4 medication(s) that are the same as other medications prescribed for you. Read the directions carefully, and ask your doctor or other care provider to review them with you.

## 2018-09-07 NOTE — OP NOTE
Procedure Date: 09/07/2018      DATE OF SURGERY:  09/07/2018      PREOPERATIVE DIAGNOSIS:  Massive weight loss with symptomatic upper arm excess skin.      POSTOPERATIVE DIAGNOSIS:  Massive weight loss with symptomatic upper arm excess skin.      PROCEDURES:  Bilateral upper arm L-brachioplasty.      SURGEON:  Eduarda Lechuga MD.        RESIDENT:  Oscar Valiente MD and Michael Awadallah, MD.        ANESTHESIA:  General anesthesia intubation.      COMPLICATIONS:  Nil.      DRAINS:  Nil.      SPECIMENS:  Bilateral upper arm skin and fat.        BLOOD LOSS:  50 mL.      DESCRIPTION OF PROCEDURE:  After informed consent was obtained from the patient, the proper site and procedure was ascertained with her and she was appropriately marked, she was taken to the operating room.  She was placed in the supine position with the knees comfortably flexed with pillows underneath them and pneumoboots placed and running prior to induction of anesthesia.  Preoperative antibiotics were given in the OR.  All pressure points appropriately padded.  General anesthesia was administered without any complications.  She was then prepped and draped in a standard surgical fashion.  I had preoperatively marked her for her brachioplasty and then remade the superior melia and then tailor-tacked the closure with staples based on the inferior markings.  I remarked the area such that I would ensure that primary closure could be attained by removing the marked out skin on each side.  I then went ahead and took out the staples and then made the upper incisions on each side and then dissected using electrocautery down to the Dennis's layer and then elevated the skin and subcutaneous flap in a prescarpal layer from anterior to posterior to the preoperatively marked posterior inferior incision.  During this course of dissection, strict hemostasis was ensured.  Once this was attained, I then went ahead and made the incision on the lateral chest area connecting  this incision to the arm incision in the axilla at 90 degrees.  The same sort of dissection was carried out to the posterior melia.  Once the skin flaps that were to be excised were elevated on each side, I then went ahead and sequentially excised the excess skin every 5 cm and as I excised the skin, stapled the arm incision closed.  This was done on each side.  Once all the excisions were completed, I ensured hemostasis and then closed all the wounds with 2-0 Monocryl suture in a deep dermal layer followed by 3-0 Monocryl suture in a running intracuticular manner followed by staples in the axillary area followed by Prineo.  I then wrapped the arms and the chest with an ACE wrap.  The patient tolerated the procedure well.  All counts were correct at the end of the case.  The patient was extubated and sent to the recovery room in stable condition.         JAKE BOYD MD             D: 2018   T: 2018   MT: EDNA      Name:     BO VALENTE   MRN:      [id number]        Account:        [id number]   :      1960           Procedure Date: 2018      Document: [id number]

## 2018-09-07 NOTE — ANESTHESIA CARE TRANSFER NOTE
Patient: Yamel Martinez    Procedure(s):  Bilateral brachioplasty - Wound Class: I-Clean    Diagnosis: Excess arm skin  Diagnosis Additional Information: No value filed.    Anesthesia Type:   General, LMA     Note:  Airway :Nasal Cannula  Patient transferred to:PACU  Comments: Awake, comfortable, sats 99%, Report to RN.Handoff Report: Identifed the Patient, Identified the Reponsible Provider, Reviewed the pertinent medical history, Discussed the surgical course, Reviewed Intra-OP anesthesia mangement and issues during anesthesia, Set expectations for post-procedure period and Allowed opportunity for questions and acknowledgement of understanding      Vitals: (Last set prior to Anesthesia Care Transfer)    CRNA VITALS  9/7/2018 1302 - 9/7/2018 1337      9/7/2018             Pulse: 78    SpO2: 98 %                Electronically Signed By: CHARLENE Alegria CRNA  September 7, 2018  1:37 PM

## 2018-09-07 NOTE — IP AVS SNAPSHOT
[facility] [place]    [address] [place] [address]    Phone:  [phone]                                       After Visit Summary   9/7/2018    Yamel Martinez    MRN: [id number]           After Visit Summary Signature Page     I have received my discharge instructions, and my questions have been answered. I have discussed any challenges I see with this plan with the nurse or doctor.    ..........................................................................................................................................  Patient/Patient Representative Signature      ..........................................................................................................................................  Patient Representative Print Name and Relationship to Patient    ..................................................               ................................................  Date                                            Time    ..........................................................................................................................................  Reviewed by Signature/Title    ...................................................              ..............................................  Date                                                            Time          22EPIC Rev 08/18

## 2018-09-07 NOTE — ANESTHESIA POSTPROCEDURE EVALUATION
Patient: Yamel Martinez    Procedure(s):  Bilateral brachioplasty - Wound Class: I-Clean    Diagnosis:Excess arm skin  Diagnosis Additional Information: No value filed.    Anesthesia Type:  General, LMA    Note:  Anesthesia Post Evaluation    Patient location during evaluation: PACU  Patient participation: Able to fully participate in evaluation  Level of consciousness: awake  Pain management: adequate  Airway patency: patent  Cardiovascular status: acceptable  Respiratory status: acceptable  Hydration status: acceptable  PONV: none     Anesthetic complications: None    Comments: Stable recovery noted        Last vitals:  Vitals:    09/07/18 1335 09/07/18 1344 09/07/18 1409   BP: (!) 156/97 (!) 161/10 153/88   Resp: 14  16   Temp: 97.9  F (36.6  C)     SpO2: 99%  99%         Electronically Signed By: Heron Palma MD  September 7, 2018  2:26 PM

## 2018-09-10 ENCOUNTER — TELEPHONE (OUTPATIENT)
Dept: SURGERY | Facility: CLINIC | Age: 58
End: 2018-09-10

## 2018-09-10 NOTE — TELEPHONE ENCOUNTER
"Pt called to schedule post op appt per  for 1-2 weeks after surgery per Staff message he sent on 9/7/18. Pt scheduled on 9/21/18 at 2:10pm. Pt states that she has had heartburn really bad and takes Omeprazole and has not had much relief. Pt states \"I haven't had a BM either though so I wonder if that has anything to do with it\". RN asked pt if she has had heartburn in the past. Pt states that yes she often gets heartburn but will take an Omeprazole and that will help. RN asked pt when her last BM was and pt states \"last Thursday\". Pt states that she started taking the Senokot last night. RN asked pt if she is taking the Oxycodone and pt states \"sometimes\". Pt states that she has not had to take it all the time. RN advised pt to try taking Tylenol 500 mg as directed and to hold off on the Oxycodone if possible as that can cause constipation.  Pt advised to increase her fluid intake, and fiber rich diet such as Bran, fruits such as prunes, grapes or green leafy vegetables. Pt advised to take the Senokot 2 tablets twice daily. Pt advised if no improvement after trying these measures to call the clinic back. RN advised pt if she experiences severe abdominal pain, vomiting to go to the ER. Pt states understanding..Evy Chanel RN    "

## 2018-09-12 ENCOUNTER — TELEPHONE (OUTPATIENT)
Dept: FAMILY MEDICINE | Facility: CLINIC | Age: 58
End: 2018-09-12

## 2018-09-12 DIAGNOSIS — L29.9 ITCHING: Primary | ICD-10-CM

## 2018-09-12 RX ORDER — HYDROXYZINE PAMOATE 25 MG/1
25 CAPSULE ORAL 3 TIMES DAILY PRN
Qty: 90 CAPSULE | Refills: 1 | Status: SHIPPED | OUTPATIENT
Start: 2018-09-12

## 2018-09-12 NOTE — TELEPHONE ENCOUNTER
Called and spoke to the patient. She said she had the excess skin removed from both arms on Friday. Patient said she has so much itching around the incision site on both arms. She denies a rash or any redness. Patient said both arms have bandages on them and she is suppose to keep them on until her staples come out which is 9/21/18. Patient said the itching is only on her arms. She denies trouble breathing or swelling in the mouth/tongue/throat/lips. No pain. Patient said the itching pretty much started right when she got home. She said she assumes some of the itching is healing but she can't take it. Patient has tried Benadryl. She reports it did not really help. Only made her sleepy. She said the incisons seem to be healing well. She denies any signs of infection.     Patient is wondering if Sheryl can recommend anything for the itching. She said she is not sure what to do but she can't take it anymore.     Will route to provider to advise.     MARY Luis, RN  [facility]

## 2018-09-12 NOTE — TELEPHONE ENCOUNTER
I sent over Vistaril for her to use for itching.     Follow up if symptoms fail to resolve as expected.     Electronically signed by Sheryl Sparks CNP.

## 2018-09-12 NOTE — TELEPHONE ENCOUNTER
Reason for Call:  Medication or medication refill:    Do you use a [facility]?  Name of the pharmacy and phone number for the current request:  [facility] - [phone]    Name of the medication requested: something for itching     Other request: Pt had arm reduction surgery last Friday. She is not having any pain, but is very itchy all over both arms. She was told to contact PCP for any non urgent post op needs.     Can we leave a detailed message on this number? YES    Phone number patient can be reached at: Home number on file [phone] (home)    Best Time: any     Call taken on 9/12/2018 at 7:30 AM by Gabriella Shaffer

## 2018-09-12 NOTE — TELEPHONE ENCOUNTER
Called and informed the patient of the message below. She understands and agrees with the plan of care.     MARY Luis, RN  [facility]

## 2018-09-13 LAB — COPATH REPORT: NORMAL

## 2018-09-17 ENCOUNTER — TELEPHONE (OUTPATIENT)
Dept: SURGERY | Facility: CLINIC | Age: 58
End: 2018-09-17

## 2018-09-17 NOTE — TELEPHONE ENCOUNTER
[facility] Call Center    Phone Message    May a detailed message be left on voicemail: yes    Reason for Call: Other: patient states that she is having some irritation at the incision.  She is questioning regarding the tape. Please advise.     Action Taken: Message routed to:  Adult Clinics: Surgery, Plastic p [phone]

## 2018-09-17 NOTE — TELEPHONE ENCOUNTER
"Pt called to report that she has had intense itching around her incision sites and at times all over. Pt contacted her PCP and was prescribed an anti-itch medication that has helped some. No rash noted, no  sob, chest tightness or swelling of tongue or throat per pt. Pt states that her eyelid area was also itching and feels like her skin was peeling off. Pt states \"I don't know if its from the tape they used to close my eyelids during surgery or what\". Pt denies any concerns with incision site itself. \"The incisions look fine\" per pt. Pt has an appt this Friday to see . Pt voiced concerns stating\" I don't know if I should come in sooner\". RN offered an appt tomorrow here in [place] with . Pt states \"do you think he will take the sutures out\"? RN advised pt that RN cannot determine that but at the appt tomorrow  will evaluate pt and advise if okay to remove staples. Pt states understanding. RN advised pt to call back with any further questions or concerns...Evy Chanel RN    "

## 2018-09-18 ENCOUNTER — OFFICE VISIT (OUTPATIENT)
Dept: SURGERY | Facility: CLINIC | Age: 58
End: 2018-09-18
Payer: COMMERCIAL

## 2018-09-18 DIAGNOSIS — L98.7 EXCESS SKIN OF ARM: Primary | ICD-10-CM

## 2018-09-18 PROCEDURE — 99024 POSTOP FOLLOW-UP VISIT: CPT | Performed by: PLASTIC SURGERY

## 2018-09-18 NOTE — NURSING NOTE
Yamel Martinez's goals for this visit include: post op  She requests these members of her care team be copied on today's visit information: no    PCP: Sheryl Sparks    Referring Provider:  No referring provider defined for this encounter.    LMP 07/20/2010 (Approximate)    Do you need any medication refills at today's visit? No

## 2018-09-18 NOTE — PROGRESS NOTES
POSTOPERATIVE VISIT      PRESENTING COMPLAINT:  Postoperative visit, status post bilateral brachioplasty done 09/07/2018.      HISTORY OF PRESENTING COMPLAINT:  Ms. Martinez is 57 years old.  She is a couple weeks out from surgery, overall doing well.  She has had a lot of itching and allergy around her arm and around the chest area.  She has been using Vistaril.        PHYSICAL EXAMINATION:  On exam, vital signs stable, she is afebrile.  In no obvious distress.  Everything is healing in well.  No evidence of infection, seroma or hematoma.  She does have some allergy type of rash around the incisions as well as around her chest.      ASSESSMENT AND PLAN:  Based on the above findings, a diagnosis of bilateral breast brachioplasty was made.  I took off all of the tape and glue and I have asked her to start wrapping her chest with an Ace wrap as well as her arms in an Ace wrap given the fact that these may be the inciting factors for her allergy.  I have also asked her to use 1% hydrocortisone cream over the incisions and continue using Vistaril as needed.  I will see her back in a week's time to remove the staples.  I have asked to wear compression garments that she has at home.  All questions were answered.  She was happy with the visit.

## 2018-09-18 NOTE — LETTER
9/18/2018         RE: Yamel Martinez  [address]  [facility] [address]        Dear Colleague,    Thank you for referring your patient, Yamel Martinez, to the [facility]. Please see a copy of my visit note below.    POSTOPERATIVE VISIT      PRESENTING COMPLAINT:  Postoperative visit, status post bilateral brachioplasty done 09/07/2018.      HISTORY OF PRESENTING COMPLAINT:  Ms. Martinez is 57 years old.  She is a couple weeks out from surgery, overall doing well.  She has had a lot of itching and allergy around her arm and around the chest area.  She has been using Vistaril.        PHYSICAL EXAMINATION:  On exam, vital signs stable, she is afebrile.  In no obvious distress.  Everything is healing in well.  No evidence of infection, seroma or hematoma.  She does have some allergy type of rash around the incisions as well as around her chest.      ASSESSMENT AND PLAN:  Based on the above findings, a diagnosis of bilateral breast brachioplasty was made.  I took off all of the tape and glue and I have asked her to start wrapping her chest with an Ace wrap as well as her arms in an Ace wrap given the fact that these may be the inciting factors for her allergy.  I have also asked her to use 1% hydrocortisone cream over the incisions and continue using Vistaril as needed.  I will see her back in a week's time to remove the staples.  I have asked to wear compression garments that she has at home.  All questions were answered.  She was happy with the visit.         Again, thank you for allowing me to participate in the care of your patient.        Sincerely,        JAKE Lechuga MD

## 2018-09-18 NOTE — NURSING NOTE
Per  bilateral surgical sites to be moisturized than tape covering sites to be removed. Surgical sites moisturized with vaseline and tape gently removed. Follow up scheduled next week...Evy Chanel RN

## 2018-09-18 NOTE — MR AVS SNAPSHOT
After Visit Summary   9/18/2018    Yamel Martinez    MRN: [id number]           Patient Information     Date Of Birth          1960        Visit Information        Provider Department      9/18/2018 2:30 PM JAKE Lechuga MD [facility]        Today's Diagnoses     Excess skin of arm    -  1       Follow-ups after your visit        Follow-up notes from your care team     Return in about 1 week (around 9/25/2018).      Your next 10 appointments already scheduled     Sep 21, 2018  2:10 PM CDT   Return Visit with JAKE Lechuga MD   [facility]    [address] [place] [address] [phone]            Sep 25, 2018  1:40 PM CDT   Return Visit with JAKE Lechuga MD   [facility] ([facility])    [address] [place] [address] [phone]              Who to contact     If you have questions or need follow up information about today's clinic visit or your schedule please contact [facility] directly at [phone].  Normal or non-critical lab and imaging results will be communicated to you by MyChart, letter or phone within 4 business days after the clinic has received the results. If you do not hear from us within 7 days, please contact the clinic through MyChart or phone. If you have a critical or abnormal lab result, we will notify you by phone as soon as possible.  Submit refill requests through [employer] or call your pharmacy and they will forward the refill request to us. Please allow 3 business days for your refill to be completed.          Additional Information About Your Visit        Care EveryWhere ID     This is your Care EveryWhere ID. This could be used by other organizations to access your [facility] medical records  [id number]        Your Vitals Were     Last Period                   07/20/2010 (Approximate)             Blood Pressure from Last 3 Encounters:   09/07/18 153/88   08/27/18 102/66   08/20/18 118/82    Weight from Last 3 Encounters:   08/27/18 211 lb   08/20/18 208 lb 6.4 oz   07/06/18 210 lb 6.4 oz              Today, you had the following     No orders found for display       Primary Care Provider Office Phone # Fax #    Sheryl Sparks, CHARLENE [phone]       [address] [facility] [address]        Equal Access to Services     JASON [facility] CenterBENITO : Hadii aad ku hadasho Soomaali, waaxda luqadaha, qaybta kaalmada adeegyada, waxdee dee kessler haymartha swanson . So [facility] [phone].    ATENCIÓN: Si habla español, tiene a solis disposición servicios gratuitos de asistencia lingüística. Ll[facility] [phone].    We comply with applicable federal civil rights laws and [place] laws. We do not discriminate on the basis of race, color, national origin, age, disability, sex, sexual orientation, or gender identity.            Thank you!     Thank you for choosing [facility]  for your care. Our goal is always to provide you with excellent care. Hearing back from our patients is one way we can continue to improve our services. Please take a few minutes to complete the written survey that you may receive in the mail after your visit with us. Thank you!             Your Updated Medication List - Protect others around you: Learn how to safely use, store and throw away your medicines at www.disposemymeds.org.          This list is accurate as of 9/18/18 11:59 PM.  Always use your most recent med list.                   Brand Name Dispense Instructions for use Diagnosis    albuterol 108 (90 Base) MCG/ACT inhaler    PROAIR HFA    8.5 g    INHALE ONE TO TWO PUFFS BY MOUTH FOUR TIMES A DAY AS NEEDED    Mild intermittent asthma without complication       CALCIUM-MAGNESIUM-ZINC PO      1 tablet daily    Mild intermittent asthma       CENTRUM SILVER PO      Take  by mouth.        * FLUoxetine 40 MG  capsule    PROzac    90 capsule    TAKE ONE CAPSULE BY MOUTH EVERY DAY WITH 20MG CAPSULE    Major depressive disorder, recurrent episode, mild (H)       * FLUoxetine 20 MG capsule    PROzac    90 capsule    Take 1 capsule (20 mg) by mouth daily    Major depressive disorder, recurrent episode, mild (H)       fluticasone 50 MCG/ACT spray    FLONASE    1 Bottle    Spray 1-2 sprays into both nostrils daily    Acute recurrent maxillary sinusitis       hydrOXYzine 25 MG capsule    VISTARIL    90 capsule    Take 1 capsule (25 mg) by mouth 3 times daily as needed for itching    Itching       montelukast 10 MG tablet    SINGULAIR    90 tablet    Take 1 tablet (10 mg) by mouth At Bedtime    House dust mite allergy, Allergic rhinitis due to animal dander, Allergy to mold spores       omeprazole 40 MG capsule    priLOSEC    90 capsule    TAKE ONE CAPSULE BY MOUTH EVERY DAY 30-60 MINUTES BEFORE A MEAL --PATIENT REQUESTS SANDOZ BRAND - Fill when requested    Gastroesophageal reflux disease without esophagitis       * ondansetron 4 MG ODT tab    ZOFRAN-ODT    60 tablet    DISSOLVE TWO TABLETS ON TONGUE EVERY 8 HOURS AS NEEDED FOR NAUSEA    Nausea       * ondansetron 4 MG ODT tab    ZOFRAN-ODT    8 tablet    Take 1 tablet (4 mg) by mouth every 6 hours as needed for nausea    Excess skin of upper extremity       oxyCODONE IR 5 MG tablet    ROXICODONE    30 tablet    Take 1-2 tablets (5-10 mg) by mouth every 6 hours as needed for other (Moderate to Severe Pain)    Excess skin of upper extremity       senna-docusate 8.6-50 MG per tablet    SENOKOT-S;PERICOLACE    30 tablet    Take 1-2 tablets by mouth 2 times daily    Excess skin of upper extremity       ZYRTEC 10 MG tablet   Generic drug:  cetirizine     1 month    ONE TABLET DAILY    Allergic rhinitis, cause unspecified       * Notice:  This list has 4 medication(s) that are the same as other medications prescribed for you. Read the directions carefully, and ask your doctor or other  care provider to review them with you.

## 2018-09-25 ENCOUNTER — OFFICE VISIT (OUTPATIENT)
Dept: SURGERY | Facility: CLINIC | Age: 58
End: 2018-09-25
Payer: COMMERCIAL

## 2018-09-25 DIAGNOSIS — L98.7 EXCESS SKIN OF ARM: Primary | ICD-10-CM

## 2018-09-25 PROCEDURE — 99024 POSTOP FOLLOW-UP VISIT: CPT | Performed by: PLASTIC SURGERY

## 2018-09-25 NOTE — MR AVS SNAPSHOT
After Visit Summary   9/25/2018    Yamel Martinez    MRN: [id number]           Patient Information     Date Of Birth          1960        Visit Information        Provider Department      9/25/2018 1:40 PM JAKE Lechuga MD [facility]         Follow-ups after your visit        Your next 10 appointments already scheduled     Oct 26, 2018  1:00 PM CDT   Return Visit with JAKE Lechuga MD   [facility]    [address] [place] [id number] [phone]              Who to contact     If you have questions or need follow up information about today's clinic visit or your schedule please contact [facility] directly at [phone].  Normal or non-critical lab and imaging results will be communicated to you by MyChart, letter or phone within 4 business days after the clinic has received the results. If you do not hear from us within 7 days, please contact the clinic through MyChart or phone. If you have a critical or abnormal lab result, we will notify you by phone as soon as possible.  Submit refill requests through [employer] or call your pharmacy and they will forward the refill request to us. Please allow 3 business days for your refill to be completed.          Additional Information About Your Visit        Care EveryWhere ID     This is your Care EveryWhere ID. This could be used by other organizations to access your [facility] medical records  [id number]        Your Vitals Were     Last Period                   07/20/2010 (Approximate)            Blood Pressure from Last 3 Encounters:   09/07/18 153/88   08/27/18 102/66   08/20/18 118/82    Weight from Last 3 Encounters:   08/27/18 95.7 kg (211 lb)   08/20/18 94.5 kg (208 lb 6.4 oz)   07/06/18 95.4 kg (210 lb 6.4 oz)              Today, you had the following     No orders found for display       Primary Care Provider Office Phone # Fax #     Sheryl Sparks, APRN [phone]       [address] [facility] [address]        Equal Access to Services     RICK SEGURA : Hadwilfredo aad ku hadmarcy Perez, wayadida carrollramón, hunterta kanallelyda edgard, chase ivelissein hayaan leidykaleb cavazos sky hawkins. So [facility] [phone].    ATENCIÓN: Si habla español, tiene a solis disposición servicios gratuitos de asistencia lingüística. Llame al [phone].    We comply with applicable federal civil rights laws and [place] laws. We do not discriminate on the basis of race, color, national origin, age, disability, sex, sexual orientation, or gender identity.            Thank you!     Thank you for choosing [facility]  for your care. Our goal is always to provide you with excellent care. Hearing back from our patients is one way we can continue to improve our services. Please take a few minutes to complete the written survey that you may receive in the mail after your visit with us. Thank you!             Your Updated Medication List - Protect others around you: Learn how to safely use, store and throw away your medicines at www.disposemymeds.org.          This list is accurate as of 9/25/18  2:34 PM.  Always use your most recent med list.                   Brand Name Dispense Instructions for use Diagnosis    albuterol 108 (90 Base) MCG/ACT inhaler    PROAIR HFA    8.5 g    INHALE ONE TO TWO PUFFS BY MOUTH FOUR TIMES A DAY AS NEEDED    Mild intermittent asthma without complication       CALCIUM-MAGNESIUM-ZINC PO      1 tablet daily    Mild intermittent asthma       CENTRUM SILVER PO      Take  by mouth.        * FLUoxetine 40 MG capsule    PROzac    90 capsule    TAKE ONE CAPSULE BY MOUTH EVERY DAY WITH 20MG CAPSULE    Major depressive disorder, recurrent episode, mild (H)       * FLUoxetine 20 MG capsule    PROzac    90 capsule    Take 1 capsule (20 mg) by mouth daily    Major depressive disorder, recurrent episode, mild (H)       fluticasone 50  MCG/ACT spray    FLONASE    1 Bottle    Spray 1-2 sprays into both nostrils daily    Acute recurrent maxillary sinusitis       hydrOXYzine 25 MG capsule    VISTARIL    90 capsule    Take 1 capsule (25 mg) by mouth 3 times daily as needed for itching    Itching       montelukast 10 MG tablet    SINGULAIR    90 tablet    Take 1 tablet (10 mg) by mouth At Bedtime    House dust mite allergy, Allergic rhinitis due to animal dander, Allergy to mold spores       omeprazole 40 MG capsule    priLOSEC    90 capsule    TAKE ONE CAPSULE BY MOUTH EVERY DAY 30-60 MINUTES BEFORE A MEAL --PATIENT REQUESTS SANDOZ BRAND - Fill when requested    Gastroesophageal reflux disease without esophagitis       * ondansetron 4 MG ODT tab    ZOFRAN-ODT    60 tablet    DISSOLVE TWO TABLETS ON TONGUE EVERY 8 HOURS AS NEEDED FOR NAUSEA    Nausea       * ondansetron 4 MG ODT tab    ZOFRAN-ODT    8 tablet    Take 1 tablet (4 mg) by mouth every 6 hours as needed for nausea    Excess skin of upper extremity       oxyCODONE IR 5 MG tablet    ROXICODONE    30 tablet    Take 1-2 tablets (5-10 mg) by mouth every 6 hours as needed for other (Moderate to Severe Pain)    Excess skin of upper extremity       senna-docusate 8.6-50 MG per tablet    SENOKOT-S;PERICOLACE    30 tablet    Take 1-2 tablets by mouth 2 times daily    Excess skin of upper extremity       ZYRTEC 10 MG tablet   Generic drug:  cetirizine     1 month    ONE TABLET DAILY    Allergic rhinitis, cause unspecified       * Notice:  This list has 4 medication(s) that are the same as other medications prescribed for you. Read the directions carefully, and ask your doctor or other care provider to review them with you.

## 2018-09-25 NOTE — PROGRESS NOTES
PRESENTING COMPLAINT:  Postoperative visit, status post bilateral brachioplasty done 09/07/2018.       HISTORY OF PRESENTING COMPLAINT:  Ms. Martinez is 57 years old.  She is 3 weeks out from surgery, overall doing well.  Doing well. Itching improved.         PHYSICAL EXAMINATION:  On exam, vital signs stable, she is afebrile.  In no obvious distress.  Everything is healing in well.  No evidence of infection, seroma or hematoma.         ASSESSMENT AND PLAN:  Based on the above findings, a diagnosis of bilateral breast brachioplasty was made.Sutures removed.  I will see her back in 4 weeks. I have asked to wear compression garments that she has at home.  All questions were answered.  She was happy with the visit.

## 2018-09-25 NOTE — NURSING NOTE
Yamel Martinez's goals for this visit include: return   She requests these members of her care team be copied on today's visit information:       PCP: Sheryl Sparks    Referring Provider:  No referring provider defined for this encounter.    LMP 07/20/2010 (Approximate)    Do you need any medication refills at today's visit?

## 2018-09-25 NOTE — LETTER
9/25/2018         RE: Yamel Martinez  [address]  [facility] [address]        Dear Colleague,    Thank you for referring your patient, Yamel Martinez, to the [facility]. Please see a copy of my visit note below.    PRESENTING COMPLAINT:  Postoperative visit, status post bilateral brachioplasty done 09/07/2018.       HISTORY OF PRESENTING COMPLAINT:  Ms. Martinez is 57 years old.  She is 3 weeks out from surgery, overall doing well.   Doing well. Itching improved.         PHYSICAL EXAMINATION:  On exam, vital signs stable, she is afebrile.  In no obvious distress.  Everything is healing in well.  No evidence of infection, seroma or hematoma.         ASSESSMENT AND PLAN:  Based on the above findings, a diagnosis of bilateral breast brachioplasty was made.Sutures removed.  I will see her back in 4 weeks. I have asked to wear compression garments that she has at home.  All questions were answered.  She was happy with the visit.        Again, thank you for allowing me to participate in the care of your patient.        Sincerely,        JAKE Lechuga MD

## 2018-09-25 NOTE — NURSING NOTE
Suture Removal Record  S: Yamel presents to the clinic today for  removal of sutures and staples.  The patient has had the sutures and staples in place for 18 days.    There has been no history of infection or drainage.    O: Staple are seen located on the both the left and right arms.  The wound is healing well with no signs of infection.      A: Suture and Staple removal.    P:  All sutures and staples were easily removed today.  Routine wound care discussed.  The patient will follow up as needed.    (Assisted by Teri Bunch left arm)    Hansa Santos LPN (right arm)

## 2018-10-17 ENCOUNTER — OFFICE VISIT (OUTPATIENT)
Dept: FAMILY MEDICINE | Facility: OTHER | Age: 58
End: 2018-10-17
Payer: COMMERCIAL

## 2018-10-17 VITALS
SYSTOLIC BLOOD PRESSURE: 116 MMHG | DIASTOLIC BLOOD PRESSURE: 80 MMHG | HEART RATE: 108 BPM | WEIGHT: 214 LBS | TEMPERATURE: 98.1 F | OXYGEN SATURATION: 97 % | BODY MASS INDEX: 35.61 KG/M2 | RESPIRATION RATE: 20 BRPM

## 2018-10-17 DIAGNOSIS — Z91.09 HOUSE DUST MITE ALLERGY: ICD-10-CM

## 2018-10-17 DIAGNOSIS — K21.9 GASTROESOPHAGEAL REFLUX DISEASE WITHOUT ESOPHAGITIS: ICD-10-CM

## 2018-10-17 DIAGNOSIS — F33.0 MAJOR DEPRESSIVE DISORDER, RECURRENT EPISODE, MILD (H): Primary | ICD-10-CM

## 2018-10-17 DIAGNOSIS — Z23 NEED FOR PROPHYLACTIC VACCINATION AND INOCULATION AGAINST INFLUENZA: ICD-10-CM

## 2018-10-17 DIAGNOSIS — Z91.09 ALLERGY TO MOLD SPORES: ICD-10-CM

## 2018-10-17 DIAGNOSIS — J30.81 ALLERGIC RHINITIS DUE TO ANIMAL DANDER: ICD-10-CM

## 2018-10-17 PROCEDURE — 99214 OFFICE O/P EST MOD 30 MIN: CPT | Mod: 25 | Performed by: NURSE PRACTITIONER

## 2018-10-17 PROCEDURE — 90471 IMMUNIZATION ADMIN: CPT | Performed by: NURSE PRACTITIONER

## 2018-10-17 PROCEDURE — 90682 RIV4 VACC RECOMBINANT DNA IM: CPT | Performed by: NURSE PRACTITIONER

## 2018-10-17 RX ORDER — MONTELUKAST SODIUM 10 MG/1
10 TABLET ORAL AT BEDTIME
Qty: 90 TABLET | Refills: 1 | Status: SHIPPED | OUTPATIENT
Start: 2018-10-17

## 2018-10-17 RX ORDER — FLUTICASONE PROPIONATE 50 MCG
1-2 SPRAY, SUSPENSION (ML) NASAL DAILY
Qty: 1 BOTTLE | Refills: 11 | Status: SHIPPED | OUTPATIENT
Start: 2018-10-17

## 2018-10-17 RX ORDER — FLUOXETINE 40 MG/1
CAPSULE ORAL
Qty: 90 CAPSULE | Refills: 1 | Status: SHIPPED | OUTPATIENT
Start: 2018-10-17

## 2018-10-17 RX ORDER — OMEPRAZOLE 40 MG/1
CAPSULE, DELAYED RELEASE ORAL
Qty: 90 CAPSULE | Refills: 1 | Status: SHIPPED | OUTPATIENT
Start: 2018-10-17

## 2018-10-17 NOTE — PATIENT INSTRUCTIONS
I am going to check with the supervisor and see if we can just print off your records here.  We will call after we get a hold of her.     Good luck with every thing in [place]!!

## 2018-10-17 NOTE — MR AVS SNAPSHOT
After Visit Summary   10/17/2018    Yamel Martinez    MRN: [id number]           Patient Information     Date Of Birth          1960        Visit Information        Provider Department      10/17/2018 7:40 AM Sheryl Sparks APRN CNP [facility]        Today's Diagnoses     Major depressive disorder, recurrent episode, mild (H)    -  1    Acute recurrent maxillary sinusitis        House dust mite allergy        Allergic rhinitis due to animal dander        Allergy to mold spores        Gastroesophageal reflux disease without esophagitis        Need for prophylactic vaccination and inoculation against influenza          Care Instructions    I am going to check with the supervisor and see if we can just print off your records here.  We will call after we get a hold of her.     Good luck with every thing in [place]!!    When you get a referral to a new surgeon to look at your arms, but sure to tell them you are having new symptoms now since the surgery so hopefully your insurance won't have any issue with another procedure if you need it.           Follow-ups after your visit        Follow-up notes from your care team     Return in about 3 months (around 1/17/2019) for establish care with a new provider.      Your next 10 appointments already scheduled     Oct 26, 2018  1:00 PM CDT   Return Visit with JAKE Lechuga MD   [facility] ([facility]    [address] [place] [address] [phone]              Who to contact     If you have questions or need follow up information about today's clinic visit or your schedule please contact [facility] directly at [phone].  Normal or non-critical lab and imaging results will be communicated to you by MyChart, letter or phone within 4 business days after the clinic has received the results. If you do not hear from us within 7 days, please contact the clinic through [employer]  "or phone. If you have a critical or abnormal lab result, we will notify you by phone as soon as possible.  Submit refill requests through [employer] or call your pharmacy and they will forward the refill request to us. Please allow 3 business days for your refill to be completed.          Additional Information About Your Visit        [employer]hart Information     [employer] lets you send messages to your doctor, view your test results, renew your prescriptions, schedule appointments and more. To sign up, go to [email or web address] . Click on \"Log in\" on the left side of the screen, which will take you to the Welcome page. Then click on \"Sign up Now\" on the right side of the page.     You will be asked to enter the access code listed below, as well as some personal information. Please follow the directions to create your username and password.     Your access code is: [email or web address]  Expires: 2019  3:09 PM     Your access code will  in 90 days. If you need help or a new code, please call your [facility] clinic or [phone].        Care EveryWhere ID     This is your Care EveryWhere ID. This could be used by other organizations to access your [facility] medical records  [id number]        Your Vitals Were     Pulse Temperature Respirations Last Period Pulse Oximetry BMI (Body Mass Index)    108 98.1  F (36.7  C) (Tympanic) 20 2010 (Exact Date) 97% 35.61 kg/m2       Blood Pressure from Last 3 Encounters:   10/17/18 116/80   18 153/88   18 102/66    Weight from Last 3 Encounters:   10/17/18 214 lb (97.1 kg)   18 211 lb (95.7 kg)   18 208 lb 6.4 oz (94.5 kg)              We Performed the Following     FLU VACCINE, (RIV4) RECOMBINANT HA  , IM (FluBlok, egg free) [84879]- >18 YRS (G recommended  50-64 YRS)     Vaccine Administration, Initial [71205]          Where to get your medicines      These medications were sent to [facility] [place], [place] - [address]   [address] " [facility] [address]     Phone:  [phone]     FLUoxetine 20 MG capsule    FLUoxetine 40 MG capsule    fluticasone 50 MCG/ACT spray    montelukast 10 MG tablet    omeprazole 40 MG capsule          Primary Care Provider Office Phone # Fax #    CHARLENE Cardoso [phone]       [address] [facility] [address]        Equal Access to Services     RICK SEGURA : Hadii aad ku hadasho Soomaali, waaxda luqadaha, qaybta kaalmada adeegyada, waxay idiin hayaan adeeg kharash la'aan . So [facility] [phone].    ATENCIÓN: Si habla lisa, tiene a solis disposición servicios gratuitos de asistencia lingüística. Llame al [phone].    We comply with applicable federal civil rights laws and [place] laws. We do not discriminate on the basis of race, color, national origin, age, disability, sex, sexual orientation, or gender identity.            Thank you!     Thank you for choosing [facility]  for your care. Our goal is always to provide you with excellent care. Hearing back from our patients is one way we can continue to improve our services. Please take a few minutes to complete the written survey that you may receive in the mail after your visit with us. Thank you!             Your Updated Medication List - Protect others around you: Learn how to safely use, store and throw away your medicines at www.disposemymeds.org.          This list is accurate as of 10/17/18  8:26 AM.  Always use your most recent med list.                   Brand Name Dispense Instructions for use Diagnosis    albuterol 108 (90 Base) MCG/ACT inhaler    PROAIR HFA    8.5 g    INHALE ONE TO TWO PUFFS BY MOUTH FOUR TIMES A DAY AS NEEDED    Mild intermittent asthma without complication       CALCIUM-MAGNESIUM-ZINC PO      1 tablet daily    Mild intermittent asthma       CENTRUM SILVER PO      Take  by mouth.        * FLUoxetine 40 MG capsule    PROzac    90 capsule    TAKE ONE CAPSULE BY MOUTH EVERY DAY WITH 20MG  CAPSULE    Major depressive disorder, recurrent episode, mild (H)       * FLUoxetine 20 MG capsule    PROzac    90 capsule    Take 1 capsule (20 mg) by mouth daily    Major depressive disorder, recurrent episode, mild (H)       fluticasone 50 MCG/ACT spray    FLONASE    1 Bottle    Spray 1-2 sprays into both nostrils daily    Acute recurrent maxillary sinusitis       hydrOXYzine 25 MG capsule    VISTARIL    90 capsule    Take 1 capsule (25 mg) by mouth 3 times daily as needed for itching    Itching       montelukast 10 MG tablet    SINGULAIR    90 tablet    Take 1 tablet (10 mg) by mouth At Bedtime    House dust mite allergy, Allergic rhinitis due to animal dander, Allergy to mold spores       omeprazole 40 MG capsule    priLOSEC    90 capsule    TAKE ONE CAPSULE BY MOUTH EVERY DAY 30-60 MINUTES BEFORE A MEAL --PATIENT REQUESTS SANDOZ BRAND - Fill when requested    Gastroesophageal reflux disease without esophagitis       * ondansetron 4 MG ODT tab    ZOFRAN-ODT    60 tablet    DISSOLVE TWO TABLETS ON TONGUE EVERY 8 HOURS AS NEEDED FOR NAUSEA    Nausea       * ondansetron 4 MG ODT tab    ZOFRAN-ODT    8 tablet    Take 1 tablet (4 mg) by mouth every 6 hours as needed for nausea    Excess skin of upper extremity       oxyCODONE IR 5 MG tablet    ROXICODONE    30 tablet    Take 1-2 tablets (5-10 mg) by mouth every 6 hours as needed for other (Moderate to Severe Pain)    Excess skin of upper extremity       senna-docusate 8.6-50 MG per tablet    SENOKOT-S;PERICOLACE    30 tablet    Take 1-2 tablets by mouth 2 times daily    Excess skin of upper extremity       ZYRTEC 10 MG tablet   Generic drug:  cetirizine     1 month    ONE TABLET DAILY    Allergic rhinitis, cause unspecified       * Notice:  This list has 4 medication(s) that are the same as other medications prescribed for you. Read the directions carefully, and ask your doctor or other care provider to review them with you.

## 2018-10-17 NOTE — PROGRESS NOTES
SUBJECTIVE:   Yamel Martinez is a 57 year old female who presents to clinic today for the following health issues:      Depression Followup    Status since last visit: Stable     See PHQ-9 for current symptoms.  Other associated symptoms: None    Complicating factors:   Significant life event:  Yes-  Moving to [place]   Current substance abuse:  None  Anxiety or Panic symptoms:  No    PHQ 12/19/2016 8/1/2017 4/4/2018   PHQ-9 Total Score 0 5 5   Q9: Suicide Ideation Not at all Not at all Not at all     ALLERGIES      Duration: seasonal, ongoing    History (similar episodes/allergy testing): Hx of    Precipitating or alleviating factors: None    Therapies tried and outcome: singulair and zyrtec help    Gastrointestinal symptoms      Duration: ongoing    Description:           REFLUX SYMPTOMS - GERD      Intensity:  mild    Accompanying signs and symptoms:      History  Previous {similar problem: YES      Aggravating factors: lying down    Alleviating factors: Prilosec    Other Therapies tried: Tums, Zantac-didn't help    She is moving to [place] at the end of this week and needs refills until she can establish care with a new provider there.  Chronic conditions are stable and well managed.     She is also following after her bilateral bronchoplasty last month.  This was for the treatment of excessive skin after her weight loss.  She is not happy with the results.  Still has significant excess tissue in both upper arms.  She also has numbness and this is new since the surgery.  She is having issues with her insurance and needs to provide medical records of this surgery to them.  She has been unable to get these records from  [employer] and wants to know if she can get them today.       Problem list and histories reviewed & adjusted, as indicated.  Additional history: as documented    Patient Active Problem List   Diagnosis     Mild intermittent asthma without complication     CARDIOVASCULAR SCREENING; LDL GOAL LESS THAN  160     Chronic maxillary sinusitis     Major depressive disorder, recurrent episode, mild (H)     Diagnostic skin and sensitization tests     Allergic rhinitis due to animal dander     House dust mite allergy     Allergy to mold spores     Post-operative pain     Vulvar cancer (H)     Hypertension goal BP (blood pressure) < 140/90     Asymptomatic varicose veins     Gastroesophageal reflux disease without esophagitis     Hip pain, right     Seasonal allergic rhinitis     Osteoarthritis of right hip, unspecified osteoarthritis type     Arthritis of right hip     S/P total hip arthroplasty     Primary osteoarthritis of left shoulder     Excess skin of arm     Morbid obesity (H)     Past Surgical History:   Procedure Laterality Date     ABDOMEN SURGERY      gastric sleeve     ARTHROPLASTY HIP Right 3/28/2016    Procedure: ARTHROPLASTY HIP;  Surgeon: Juan Espinoza MD;  Location: [facility] OR     C LIGATE FALLOPIAN TUBE       C NONSPECIFIC PROCEDURE      Sinus surgery cyst and septum     C NONSPECIFIC PROCEDURE      Vein closure     COSMETIC LIPOSUCTION, BRACHIOPLASTY, COMBINED Bilateral 9/7/2018    Procedure: COMBINED COSMETIC LIPOSUCTION, BRACHIOPLASTY;  Bilateral brachioplasty;  Surgeon: JAKE Lechuga MD;  Location: [facility] OR     HC COLP CERVIX/UPPER VAGINA W LOOP ELEC BX CERVIX  1999     INCISION AND DRAINAGE HIP, COMBINED  2010    Post operative     JOINT REPLACEMENT, HIP RT/LT  2010    Joint Replacement Hip LT     JOINT REPLACEMENT, HIP RT/LT  01/07/13    left total hip arthroplasty     VULVECTOMY RADICAL (NO GROIN DISSECTION)  4/18/2013    Procedure: VULVECTOMY RADICAL (NO GROIN DISSECTION);  Left radical Vulvectomy ;  Surgeon: Gloria Broussard MD;  Location: [facility] OR       Social History   Substance Use Topics     Smoking status: Light Tobacco Smoker     Packs/day: 0.50     Last attempt to quit: 1/1/2013     Smokeless tobacco: Never Used      Comment: Quit 3 yrs ago, however smokes up to 5 cigarettes weekly      Alcohol use 0.0 oz/week     0 Standard drinks or equivalent per week      Comment: 3  weekly     Family History   Problem Relation Age of Onset     Diabetes Father      Thyroid Disease Father      cancer of thyroid     Hypertension Mother      Diabetes Paternal Grandmother      Breast Cancer Maternal Grandmother      Anesthesia Reaction No family hx of      Blood Disease No family hx of      C.A.D. No family hx of      Cancer - colorectal No family hx of          Current Outpatient Prescriptions   Medication Sig Dispense Refill     albuterol (PROAIR HFA) 108 (90 Base) MCG/ACT Inhaler INHALE ONE TO TWO PUFFS BY MOUTH FOUR TIMES A DAY AS NEEDED 8.5 g 1     CALCIUM-MAGNESIUM-ZINC PO 1 tablet daily       FLUoxetine (PROZAC) 20 MG capsule Take 1 capsule (20 mg) by mouth daily 90 capsule 1     FLUoxetine (PROZAC) 40 MG capsule TAKE ONE CAPSULE BY MOUTH EVERY DAY WITH 20MG CAPSULE 90 capsule 1     fluticasone (FLONASE) 50 MCG/ACT spray Spray 1-2 sprays into both nostrils daily 1 Bottle 11     montelukast (SINGULAIR) 10 MG tablet Take 1 tablet (10 mg) by mouth At Bedtime 90 tablet 1     Multiple Vitamins-Minerals (CENTRUM SILVER PO) Take  by mouth.       omeprazole (PRILOSEC) 40 MG capsule TAKE ONE CAPSULE BY MOUTH EVERY DAY 30-60 MINUTES BEFORE A MEAL --PATIENT REQUESTS [employer] BRAND - Fill when requested 90 capsule 1     ZYRTEC 10 MG OR TABS ONE TABLET DAILY 1 month 2     hydrOXYzine (VISTARIL) 25 MG capsule Take 1 capsule (25 mg) by mouth 3 times daily as needed for itching 90 capsule 1     ondansetron (ZOFRAN-ODT) 4 MG ODT tab Take 1 tablet (4 mg) by mouth every 6 hours as needed for nausea 8 tablet 0     ondansetron (ZOFRAN-ODT) 4 MG ODT tab DISSOLVE TWO TABLETS ON TONGUE EVERY 8 HOURS AS NEEDED FOR NAUSEA 60 tablet 0     oxyCODONE IR (ROXICODONE) 5 MG tablet Take 1-2 tablets (5-10 mg) by mouth every 6 hours as needed for other (Moderate to Severe Pain) 30 tablet 0     senna-docusate (SENOKOT-S;PERICOLACE) 8.6-50 MG  per tablet Take 1-2 tablets by mouth 2 times daily 30 tablet 0     [DISCONTINUED] FLUoxetine (PROZAC) 20 MG capsule Take 1 capsule (20 mg) by mouth daily 90 capsule 1     [DISCONTINUED] FLUoxetine (PROZAC) 40 MG capsule TAKE ONE CAPSULE BY MOUTH EVERY DAY WITH 20MG CAPSULE 90 capsule 1     [DISCONTINUED] montelukast (SINGULAIR) 10 MG tablet Take 1 tablet (10 mg) by mouth At Bedtime 90 tablet 1     Allergies   Allergen Reactions     Latex Itching     Lisinopril      Cough     Red Dye Itching     BP Readings from Last 3 Encounters:   10/17/18 116/80   09/07/18 153/88   08/27/18 102/66    Wt Readings from Last 3 Encounters:   10/17/18 214 lb (97.1 kg)   08/27/18 211 lb (95.7 kg)   08/20/18 208 lb 6.4 oz (94.5 kg)                    Reviewed and updated as needed this visit by clinical staff  Tobacco  Allergies  Med Hx  Surg Hx  Fam Hx  Soc Hx      Reviewed and updated as needed this visit by Provider         ROS:  Constitutional, HEENT, cardiovascular, pulmonary, GI, , musculoskeletal, neuro, skin, endocrine and psych systems are negative, except as otherwise noted.    OBJECTIVE:     /80  Pulse 108  Temp 98.1  F (36.7  C) (Tympanic)  Resp 20  Wt 214 lb (97.1 kg)  LMP 07/20/2010 (Exact Date)  SpO2 97%  BMI 35.61 kg/m2  Body mass index is 35.61 kg/(m^2).  GENERAL: alert, no distress and obese  NECK: no adenopathy, no asymmetry, masses, or scars and thyroid normal to palpation  RESP: lungs clear to auscultation - no rales, rhonchi or wheezes  CV: regular rate and rhythm, normal S1 S2, no S3 or S4, no murmur, click or rub, no peripheral edema and peripheral pulses strong  MS: no gross musculoskeletal defects noted, no edema  SKIN: healing incision on both upper arms, excessive tissue/skin in both upper arms.  No signs of infection.   PSYCH: mentation appears normal, affect normal/bright    Diagnostic Test Results:  none     ASSESSMENT/PLAN:          Major depressive disorder, recurrent episode, mild  (H)  Chronic, controlled.  No change in treatment plan.  Will establish care with a new provider in [place] for further refills.   - FLUoxetine (PROZAC) 40 MG capsule; TAKE ONE CAPSULE BY MOUTH EVERY DAY WITH 20MG CAPSULE  Dispense: 90 capsule; Refill: 1  - FLUoxetine (PROZAC) 20 MG capsule; Take 1 capsule (20 mg) by mouth daily  Dispense: 90 capsule; Refill: 1     House dust mite allergy  Chronic, controlled.  No change in treatment plan.   - fluticasone (FLONASE) 50 MCG/ACT spray; Spray 1-2 sprays into both nostrils daily  Dispense: 1 Bottle; Refill: 11  - montelukast (SINGULAIR) 10 MG tablet; Take 1 tablet (10 mg) by mouth At Bedtime  Dispense: 90 tablet; Refill: 1     Allergic rhinitis due to animal dander  Chronic, controlled.  No change in treatment plan.   - montelukast (SINGULAIR) 10 MG tablet; Take 1 tablet (10 mg) by mouth At Bedtime  Dispense: 90 tablet; Refill: 1     Allergy to mold spores  - montelukast (SINGULAIR) 10 MG tablet; Take 1 tablet (10 mg) by mouth At Bedtime  Dispense: 90 tablet; Refill: 1     Gastroesophageal reflux disease without esophagitis  Chronic, controlled.  No change in treatment plan.   - omeprazole (PRILOSEC) 40 MG capsule; TAKE ONE CAPSULE BY MOUTH EVERY DAY 30-60 MINUTES BEFORE A MEAL --PATIENT REQUESTS [employer] BRAND - Fill when requested  Dispense: 90 capsule; Refill: 1     Need for prophylactic vaccination and inoculation against influenza  - FLU VACCINE, (RIV4) RECOMBINANT HA  , IM (FluBlok, egg free) [36877]- >18 YRS (G recommended  50-64 YRS)  - Vaccine Administration, Initial [81259]    See Patient Instructions  Follow up to establish care with a new provider in [place] within 3 months.    I did have her sign a release of information and was able to print and give her the needed records related to her surgical procedure.  She is going to get a referral to a new surgeon in [place] as it appears she has had some complications with the surgery.     Sheryl Sparks, APRN  [facility]      Injectable Influenza Immunization Documentation    Prior to injection verified patient identity using patient's name and date of birth.  Due to injection administration, patient instructed to remain in clinic for 15 minutes  afterwards, and to report any adverse reaction to me immediately.      1.  Is the person to be vaccinated sick today?   No    2. Does the person to be vaccinated have an allergy to a component   of the vaccine?   No  Egg Allergy Algorithm Link    3. Has the person to be vaccinated ever had a serious reaction   to influenza vaccine in the past?   No    4. Has the person to be vaccinated ever had Guillain-Barré syndrome?   No    Form completed by ................Eric Ramos LPN,   October 17, 2018,      8:21 AM,   [facility]

## 2019-11-15 ENCOUNTER — TELEPHONE (OUTPATIENT)
Dept: FAMILY MEDICINE | Facility: OTHER | Age: 59
End: 2019-11-15

## 2019-11-15 NOTE — TELEPHONE ENCOUNTER
Left message for patient to call.  Received letter back in mail today.  Please verify address and update as needed.

## 2020-09-05 NOTE — Clinical Note
My Depression Action Plan  Name: Yamel Martinez   Date of Birth 1960  Date: 1/16/2017    My doctor: Albert Salmeron   My clinic: [place] [address] [facility] [address] [phone]          GREEN    ZONE   Good Control    What it looks like:     Things are going generally well. You have normal up s and down s. You may even feel depressed from time to time, but bad moods usually last less than a day.   What you need to do:  1. Continue to care for yourself (see self care plan)  2. Check your depression survival kit and update it as needed  3. Follow your physician s recommendations including any medication.  4. Do not stop taking medication unless you consult with your physician first.           YELLOW         ZONE Getting Worse    What it looks like:     Depression is starting to interfere with your life.     It may be hard to get out of bed; you may be starting to isolate yourself from others.    Symptoms of depression are starting to last most all day and this has happened for several days.     You may have suicidal thoughts but they are not constant.   What you need to do:     1. Call your care team, your response to treatment will improve if you keep your care team informed of your progress. Yellow periods are signs an adjustment may need to be made.     2. Continue your self-care, even if you have to fake it!    3. Talk to someone in your support network    4. Open up your depression survival kit           RED    ZONE Medical Alert - Get Help    What it looks like:     Depression is seriously interfering with your life.     You may experience these or other symptoms: You can t get out of bed most days, can t work or engage in other necessary activities, you have trouble taking care of basic hygiene, or basic responsibilities, thoughts of suicide or death that will not go away, self-injurious behavior.     What you need to do:  1. Call your care team and request a  same-day appointment. If they are not available (weekends or after hours) call your local crisis line, emergency room or 911.      Electronically signed by: July Collins, January 16, 2017    Depression Self Care Plan / Survival Kit    Self-Care for Depression  Here s the deal. Your body and mind are really not as separate as most people think.  What you do and think affects how you feel and how you feel influences what you do and think. This means if you do things that people who feel good do, it will help you feel better.  Sometimes this is all it takes.  There is also a place for medication and therapy depending on how severe your depression is, so be sure to consult with your medical provider and/ or Behavioral Health Consultant if your symptoms are worsening or not improving.     In order to better manage my stress, I will:    Exercise  Get some form of exercise, every day. This will help reduce pain and release endorphins, the  feel good  chemicals in your brain. This is almost as good as taking antidepressants!  This is not the same as joining a gym and then never going! (they count on that by the way ) It can be as simple as just going for a walk or doing some gardening, anything that will get you moving.      Hygiene   Maintain good hygiene (Get out of bed in the morning, Make your bed, Brush your teeth, Take a shower, and Get dressed like you were going to work, even if you are unemployed).  If your clothes don't fit try to get ones that do.    Diet  I will strive to eat foods that are good for me, drink plenty of water, and avoid excessive sugar, caffeine, alcohol, and other mood-altering substances.  Some foods that are helpful in depression are: complex carbohydrates, B vitamins, flaxseed, fish or fish oil, fresh fruits and vegetables.    Psychotherapy  I agree to participate in Individual Therapy (if recommended).    Medication  If prescribed medications, I agree to take them.  Missing doses can  result in serious side effects.  I understand that drinking alcohol, or other illicit drug use, may cause potential side effects.  I will not stop my medication abruptly without first discussing it with my provider.    Staying Connected With Others  I will stay in touch with my friends, family members, and my primary care provider/team.    Use your imagination  Be creative.  We all have a creative side; it doesn t matter if it s oil painting, sand castles, or mud pies! This will also kick up the endorphins.    Witness Beauty  (AKA stop and smell the roses) Take a look outside, even in mid-winter. Notice colors, textures. Watch the squirrels and birds.     Service to others  Be of service to others.  There is always someone else in need.  By helping others we can  get out of ourselves  and remember the really important things.  This also provides opportunities for practicing all the other parts of the program.    Humor  Laugh and be silly!  Adjust your TV habits for less news and crime-drama and more comedy.    Control your stress  Try breathing deep, massage therapy, biofeedback, and meditation. Find time to relax each day.     My support system    Clinic Contact:  Phone number:    Contact 1:  Phone number:    Contact 2:  Phone number:    [facility]/:  Phone number:    Therapist:  Phone number:    Local crisis center:    Phone number:    Other community support:  Phone number:       - - -

## 2023-07-05 ENCOUNTER — NEW PATIENT (OUTPATIENT)
Dept: URBAN - METROPOLITAN AREA CLINIC 53 | Facility: CLINIC | Age: 63
End: 2023-07-05

## 2023-07-05 DIAGNOSIS — H25.813: ICD-10-CM

## 2023-07-05 DIAGNOSIS — H04.123: ICD-10-CM

## 2023-07-05 PROCEDURE — 92015 DETERMINE REFRACTIVE STATE: CPT

## 2023-07-05 PROCEDURE — 92004 COMPRE OPH EXAM NEW PT 1/>: CPT

## 2023-07-05 ASSESSMENT — TONOMETRY
OD_IOP_MMHG: 16
OS_IOP_MMHG: 17

## 2023-07-05 ASSESSMENT — VISUAL ACUITY
OD_CC: 20/20
OS_CC: 20/20
OS_SC: 20/30-1
OD_GLARE: 20/20
OS_GLARE: 20/30
OD_GLARE: 20/25
OD_SC: 20/30-1
OS_GLARE: 20/25
OU_CC: J1-2@16"

## 2023-07-05 ASSESSMENT — KERATOMETRY
OS_K1POWER_DIOPTERS: 43.75
OD_AXISANGLE2_DEGREES: 3
OS_K2POWER_DIOPTERS: 44.25
OD_K1POWER_DIOPTERS: 44.50
OS_AXISANGLE2_DEGREES: 161
OD_K2POWER_DIOPTERS: 45.00
OD_AXISANGLE_DEGREES: 93
OS_AXISANGLE_DEGREES: 71

## (undated) DEVICE — SUCTION TIP YANKAUER W/O VENT K86

## (undated) DEVICE — ESU ELEC BLADE HEX-LOCKING 2.5" E1450X

## (undated) DEVICE — DRAPE SPLIT EENT 76X124" 3X28" 9447

## (undated) DEVICE — DRAPE IOBAN INCISE 23X17" 6650EZ

## (undated) DEVICE — SUCTION TUBING 10' N1010

## (undated) DEVICE — DRAPE SHEET 3/4 78X60"

## (undated) DEVICE — MARKER SKIN DOUBLE TIP W/FLEXI-RULER W/LABELS

## (undated) DEVICE — GLOVE PROTEXIS W/NEU-THERA 7.0  2D73TE70

## (undated) DEVICE — SU MONOCRYL 3-0 PS-2 27" Y427H

## (undated) DEVICE — SOL WATER IRRIG 1000ML BOTTLE 07139-09

## (undated) DEVICE — STPL SKIN 35W 054887

## (undated) DEVICE — SU PLAIN FAST ABSORB 5-0 PC-1 18" 1915G

## (undated) DEVICE — SPONGE LAP 18X18" 1515

## (undated) DEVICE — BLADE KNIFE SURG 10 371110

## (undated) DEVICE — DRAPE STOCKINETTE IMPERVIOUS 12" 1587

## (undated) DEVICE — SU MONOCRYL 2-0 SH 27" UND Y417H

## (undated) DEVICE — CLIP APPLIER 11" MED LIGACLIP MCM30

## (undated) DEVICE — SU DERMABOND PRINEO CLR602US

## (undated) DEVICE — DRAPE SHEET HALF 40X60" 9358

## (undated) DEVICE — TUBING SMOKE EVAC 7/8"X6 UNSTERILE LVT-102

## (undated) DEVICE — SUCTION SMOKE EVAC TUBING REDUCER STACKHOUSE

## (undated) DEVICE — Device

## (undated) DEVICE — PREP CHLORAPREP 26ML TINTED ORANGE  260815

## (undated) DEVICE — PACK MINOR SBA15MIFSE

## (undated) DEVICE — DRSG ABDOMINAL 07 1/2X8" 7197D

## (undated) DEVICE — SU DERMABOND DHV12

## (undated) RX ORDER — LABETALOL HYDROCHLORIDE 5 MG/ML
INJECTION, SOLUTION INTRAVENOUS
Status: DISPENSED
Start: 2018-09-07

## (undated) RX ORDER — PROPOFOL 10 MG/ML
INJECTION, EMULSION INTRAVENOUS
Status: DISPENSED
Start: 2018-09-07

## (undated) RX ORDER — HYDROMORPHONE HYDROCHLORIDE 2 MG/ML
INJECTION, SOLUTION INTRAMUSCULAR; INTRAVENOUS; SUBCUTANEOUS
Status: DISPENSED
Start: 2018-09-07

## (undated) RX ORDER — CEFAZOLIN SODIUM 2 G/100ML
INJECTION, SOLUTION INTRAVENOUS
Status: DISPENSED
Start: 2018-09-07

## (undated) RX ORDER — FENTANYL CITRATE 50 UG/ML
INJECTION, SOLUTION INTRAMUSCULAR; INTRAVENOUS
Status: DISPENSED
Start: 2018-09-07

## (undated) RX ORDER — CEFAZOLIN SODIUM 1 G/3ML
INJECTION, POWDER, FOR SOLUTION INTRAMUSCULAR; INTRAVENOUS
Status: DISPENSED
Start: 2018-09-07

## (undated) RX ORDER — CITRIC ACID/SODIUM CITRATE 334-500MG
SOLUTION, ORAL ORAL
Status: DISPENSED
Start: 2018-09-07

## (undated) RX ORDER — LIDOCAINE HYDROCHLORIDE 10 MG/ML
INJECTION, SOLUTION EPIDURAL; INFILTRATION; INTRACAUDAL; PERINEURAL
Status: DISPENSED
Start: 2018-09-07

## (undated) RX ORDER — OXYCODONE HYDROCHLORIDE 5 MG/1
TABLET ORAL
Status: DISPENSED
Start: 2018-09-07

## (undated) RX ORDER — DEXAMETHASONE SODIUM PHOSPHATE 4 MG/ML
INJECTION, SOLUTION INTRA-ARTICULAR; INTRALESIONAL; INTRAMUSCULAR; INTRAVENOUS; SOFT TISSUE
Status: DISPENSED
Start: 2018-09-07

## (undated) RX ORDER — ACETAMINOPHEN 325 MG/1
TABLET ORAL
Status: DISPENSED
Start: 2018-09-07

## (undated) RX ORDER — BUPIVACAINE HYDROCHLORIDE 2.5 MG/ML
INJECTION, SOLUTION INFILTRATION; PERINEURAL
Status: DISPENSED
Start: 2018-09-07

## (undated) RX ORDER — ONDANSETRON 2 MG/ML
INJECTION INTRAMUSCULAR; INTRAVENOUS
Status: DISPENSED
Start: 2018-09-07